# Patient Record
Sex: FEMALE | Race: WHITE | NOT HISPANIC OR LATINO
[De-identification: names, ages, dates, MRNs, and addresses within clinical notes are randomized per-mention and may not be internally consistent; named-entity substitution may affect disease eponyms.]

---

## 2019-04-14 ENCOUNTER — TRANSCRIPTION ENCOUNTER (OUTPATIENT)
Age: 34
End: 2019-04-14

## 2020-01-13 ENCOUNTER — TRANSCRIPTION ENCOUNTER (OUTPATIENT)
Age: 35
End: 2020-01-13

## 2020-10-20 ENCOUNTER — TRANSCRIPTION ENCOUNTER (OUTPATIENT)
Age: 35
End: 2020-10-20

## 2020-12-01 ENCOUNTER — TRANSCRIPTION ENCOUNTER (OUTPATIENT)
Age: 35
End: 2020-12-01

## 2021-06-06 ENCOUNTER — TRANSCRIPTION ENCOUNTER (OUTPATIENT)
Age: 36
End: 2021-06-06

## 2021-10-13 ENCOUNTER — NON-APPOINTMENT (OUTPATIENT)
Age: 36
End: 2021-10-13

## 2021-10-25 ENCOUNTER — TRANSCRIPTION ENCOUNTER (OUTPATIENT)
Age: 36
End: 2021-10-25

## 2021-11-16 DIAGNOSIS — M25.561 PAIN IN RIGHT KNEE: ICD-10-CM

## 2021-11-16 DIAGNOSIS — M25.562 PAIN IN RIGHT KNEE: ICD-10-CM

## 2021-11-16 PROBLEM — Z00.00 ENCOUNTER FOR PREVENTIVE HEALTH EXAMINATION: Status: ACTIVE | Noted: 2021-11-16

## 2021-11-19 ENCOUNTER — APPOINTMENT (OUTPATIENT)
Dept: ORTHOPEDIC SURGERY | Facility: CLINIC | Age: 36
End: 2021-11-19
Payer: COMMERCIAL

## 2021-11-19 ENCOUNTER — NON-APPOINTMENT (OUTPATIENT)
Age: 36
End: 2021-11-19

## 2021-11-19 VITALS — BODY MASS INDEX: 24.84 KG/M2 | WEIGHT: 135 LBS | HEIGHT: 62 IN

## 2021-11-19 DIAGNOSIS — Z87.891 PERSONAL HISTORY OF NICOTINE DEPENDENCE: ICD-10-CM

## 2021-11-19 DIAGNOSIS — Z78.9 OTHER SPECIFIED HEALTH STATUS: ICD-10-CM

## 2021-11-19 PROCEDURE — 73564 X-RAY EXAM KNEE 4 OR MORE: CPT | Mod: LT

## 2021-11-19 PROCEDURE — 20610 DRAIN/INJ JOINT/BURSA W/O US: CPT | Mod: RT

## 2021-11-19 PROCEDURE — 99204 OFFICE O/P NEW MOD 45 MIN: CPT | Mod: 25

## 2021-11-19 RX ORDER — MELOXICAM 15 MG/1
TABLET ORAL
Refills: 0 | Status: ACTIVE | COMMUNITY

## 2021-11-19 NOTE — ADDENDUM
[FreeTextEntry1] : This note was written by Federica Avelar on 11/19/2021 acting as scribe for Dr. Bartolo Harris M.D.\par \par I, Dr. Bartolo Harris, have read and attest that all the information, medical decision making and discharge instructions within are true and accurate.

## 2021-11-19 NOTE — HISTORY OF PRESENT ILLNESS
[de-identified] : CHAVEZ SMALLS  is a 36 year old female who presents for initial evaluation of bilateral knee pain for about 30 years with no injury. She said that her knee cap started dislocating at age 3. At 15 she had right knee surgery to keep it in place, at age 18 she had left knee surgery to keep it in place. At 19 she had a 2nd surgery on her left knee because the first one was not done correctly. She had post op therapy. In 2019 she had a right lateral release and in 2020 she had a left lateral release. 3 months ago she had Gel injections that did not help. Everything was done by Dr. Marcano except for the 1st surgery on the left knee. She continues to have pain over the patella. It is sharp on the left and dull when stiff.Clicking on the left with use of stairs and crunching on the right. She was given a brace which did not help. She can walk 1 mile with soreness and is unstable using stairs. Takes Mobic with help, but has to take it consistently.

## 2021-11-19 NOTE — DISCUSSION/SUMMARY
[de-identified] : Discussed at length the natural history of bilateral knee degenerative arthritis especially patellofemoral. We reviewed non-operative and operative treatment. Due to the pain, failure of prior nonoperative treatment including injections, NSAIDs, and physiotherapy, and associated disability I recommend bilateral total knee replacement even at her young age.The risks, benefits, convalescence and alternatives were reviewed. Numerous questions were asked and answered. Models were used as an educational tool.We did discuss implant choice and fixation, with shared decision making with the patient. Surgery will be scheduled at a convenient time most likely in the spring. Preop medical clearance.\par \par We did discuss their young age, level of post-op activity, mechanisms of failure and longevity of implants. In the interim she was treat with bilateral Cortisone injections as detailed above and will continue with Meloxicam. All explained to her  who was present.

## 2021-11-19 NOTE — PROCEDURE
[de-identified] : BILATERAL KNEE CORTISONE INJECTION\par Discussed at length with the patient the planned steroid and lidocaine injection. The risks, benefits, convalescence and alternatives were reviewed. The possible side effects discussed included but were not limited to: pain, swelling, heat and redness. These symptoms are generally mild but if they are extensive then contact the office. Giving pain relievers by mouth such as NSAID’s or Tylenol can generally treat the reactions to steroid and lidocaine. Rare cases of infection have been noted. Rash, hives and itching may occur post injection. If you have muscle pain or cramps, flushing and or swelling of the face, rapid heart beat, nausea, dizziness, fever, chills, headache, difficulty breathing, swelling in the arms or legs, or have a prickly feeling of your skin, contact a health care provider immediately.\par \par Following this discussion, the knee was prepped with betadine and under sterile conditions 5 cc of 2% lidocaine and 1 cc depo-medrol (40mg) were injected with a 21 gauge needle. The needle was introduced into the joint, aspiration was performed to ensure intra-articular placement and the medication was injected. Upon withdrawal of the needle the site was cleaned with alcohol and a bandaid applied. The patient tolerated the injection well and there were no adverse effects. Post injection instructions included no strenuous activity for 24 hours, cryotherapy and if there are any adverse effects to contact the office.

## 2021-11-19 NOTE — PHYSICAL EXAM
[de-identified] : General appearance: well nourished and hydrated, pleasant, alert and oriented x 3, cooperative.\par HEENT: Normocephalic, EOM intact, Nasal septum midline, Oral cavity clear, External auditory canal clear.\par Cardiovascular: no apparent abnormalities, no lower leg edema, no varicosities, pedal pulses are palpable.\par Lymphatics Lymph nodes: none palpated, Lymphedema: not present.\par Neurologic: sensation is normal, no muscle weakness in upper or lower extremities, patella tendon reflexes intact .\par Dermatologic no apparent skin lesions, moist, warm, no rash.\par Spine:cervical spine appears normal and moves freely, thoracic spine appears normal and moves freely, lumbosacral spine appears normal and moves freely.\par Gait: nonantalgic.\par \par Left knee\par Inspection: no effusion or erythema.\par Wounds: healed anterior medial incision, healed arthroscopic portals\par Alignment: normal.\par Palpation: no specific tenderness on palpation.\par ROM active (in degrees): 0-140 with crepitus and pain, apprehensive through exam \par Ligamentous laxity: all ligaments appear stable,, negative ant. drawer test, negative post. drawer test, stable to varus stress test, stable to valgus stress test. negative Lachman's test, negative pivot shift test\par Meniscal Test: negative McMurrays, negative Pennie.\par Patellofemoral Alignment Test: Q angle-, normal.\par Muscle Test: good quad strength.\par \par Right knee\par Inspection: no effusion or erythema.\par Wounds: healed midline incision, healed arthroscopic portals\par Alignment: normal.\par Palpation: medial and lateral joint line tenderness on palpation.\par ROM active (in degrees): 0-140 with crepitus and discomfort through the arc of motion, apprehensive\par Ligamentous laxity: all ligaments appear stable,, negative ant. drawer test, negative post. drawer test, stable to varus stress test, stable to valgus stress test. negative Lachman's test, negative pivot shift test\par Meniscal Test: negative McMurrays, negative Pennie.\par Patellofemoral Alignment Test: Q angle-, normal.\par Muscle Test: good quad strength.\par \par Left hip\par Inspection: No swelling or ecchymosis.\par Wounds: none.\par Palpation: non-tender.\par Stability: no instability.\par Strength: 5/5 all motor groups.\par ROM: no pain with FROM.\par Leg length: equal.\par \par Right hip\par Inspection: No swelling or ecchymosis.\par Wounds: none.\par Palpation: non-tender.\par Stability: no instability.\par Strength: 5/5 all motor groups.\par ROM: no pain with FROM.\par Leg length: equal.\par \par Left ankle\par Inspection: no erythema noted, no swelling noted.\par Palpation: no pain on palpation .\par ROM: FROM without crepitus.\par Muscle strength: 5/5.\par Stability: no instability noted.\par \par Right ankle\par Inspection: no erythema noted, no swelling noted.\par ROM: FROM without crepitus.\par Palpation: no pain on palpation .\par Muscle strength: 5/5.\par Stability: no instability noted.\par \par Left foot\par Inspection: color, texture and turgor are normal.\par ROM: full range of motion of all joints without pain or crepitus.\par Palpation: no tenderness.\par Stability: no instability noted.\par \par Right foot\par Inspection: color, texture and turgor are normal.\par ROM: full range of motion of all joints without pain or crepitus.\par Palpation: no tenderness.\par Stability: no instability noted.\par \par Left shoulder\par Inspection: no muscle asymmetry, no atrophy.\par Palpation: no tenderness noted, ACJ non-tender.\par ROM: full active ROM, full passive ROM.\par Strength testing): anterior deltoid, supraspinatus, infraspinatus, subscapularis all 5/5.\par Stability test: ant. apprehension negative, post. apprehension negative, relocation test negative.\par Impingement Test: negative NEER.\par \par Right shoulder\par Inspection: no muscle asymmetry, no atrophy.\par Palpation: no tenderness noted, ACJ non-tender.\par ROM: full active ROM, full passive ROM.\par Strength testing): anterior deltoid, supraspinatus, infraspinatus, subscapularis all 5/5.\par Stability test: ant. apprehension negative, post. apprehension negative, relocation test negative.\par Impingement Test: negative NEER.\par Surgical Wounds: none.\par \par Left elbow\par Inspection: negative swelling.\par Wounds: none.\par Palpation: non-tender.\par ROM: full ROM.\par Strength: 5/5 all groups.\par Stability: no instability.\par Mass: none.\par \par Right elbow\par Inspection: negative swelling.\par Wounds: none.\par Palpation: non-tender.\par ROM: full ROM.\par Strength: 5/5 all groups.\par Stability: no instability.\par Mass: none.\par \par Left wrist\par Inspection: negative swelling.\par Wound: none.\par Palpation (bone): no tenderness.\par ROM: full ROM.\par Strength: full , good.\par \par Right wrist\par Inspection: negative swelling.\par Wound: none.\par Palpation (bone): no tenderness.\par ROM: full ROM.\par Strength: full , good.\par \par Left hand\par Inspection: no skin changes, normal appearance.\par Wounds: none.\par Strength: full , able to make full fist.\par Sensation: light touch intact all fingers and thumb.\par Vascular: good capillary refill < 3 seconds, all fingers and thumb.\par Mass: none.\par \par Right hand\par Inspection: no skin changes, normal appearance.\par Wounds: none.\par Strength: full , able to make full fist.\par Sensation: light touch intact all fingers and thumb.\par Vascular: good capillary refill < 3 seconds, all fingers and thumb.\par Mass: none. [de-identified] : Right knee xrays, standing AP/Lateral and Merchant films, and 45 degree PA standing view, taken at the office today shows degenerative arthritis, decrease lateral joint space narrowing, marginal osteophytes, sclerosis, patellofemoral joint space narrowing, peripheral osteophytes, joint space irregularity,Kellgren and Steven grade 2-3 with significant patellofemoral arthritis\par \par Left knee xrays, standing AP/Lateral and Merchant films, and 45 degree PA standing view, taken at the office today shows diffuse tricompartmental degenerative arthritis, lateral joint space narrowing, marginal osteophytes, bone on bone, sclerosis, patellofemoral joint space narrowing, peripheral osteophytes, lateral tilt, Kellgren and Steven grade 2\par

## 2021-11-20 ENCOUNTER — TRANSCRIPTION ENCOUNTER (OUTPATIENT)
Age: 36
End: 2021-11-20

## 2021-12-02 RX ORDER — MELOXICAM 7.5 MG/1
7.5 TABLET ORAL TWICE DAILY
Qty: 60 | Refills: 2 | Status: ACTIVE | COMMUNITY
Start: 2021-12-02 | End: 1900-01-01

## 2022-01-11 ENCOUNTER — APPOINTMENT (OUTPATIENT)
Dept: AFTER HOURS CARE | Facility: EMERGENCY ROOM | Age: 37
End: 2022-01-11
Payer: COMMERCIAL

## 2022-01-11 DIAGNOSIS — U07.1 COVID-19: ICD-10-CM

## 2022-01-11 PROCEDURE — 99203 OFFICE O/P NEW LOW 30 MIN: CPT | Mod: 95

## 2022-01-11 RX ORDER — ALBUTEROL SULFATE 90 UG/1
108 (90 BASE) INHALANT RESPIRATORY (INHALATION)
Qty: 1 | Refills: 1 | Status: ACTIVE | COMMUNITY
Start: 2022-01-11 | End: 1900-01-01

## 2022-01-11 RX ORDER — BENZONATATE 100 MG/1
100 CAPSULE ORAL
Qty: 21 | Refills: 0 | Status: ACTIVE | COMMUNITY
Start: 2022-01-11 | End: 1900-01-01

## 2022-01-11 NOTE — PLAN
[FreeTextEntry1] : pulm referral\par rx albuterol\par rx tessalon\par anticipatory guidance and precautions\par pmd follow up

## 2022-01-11 NOTE — ASSESSMENT
[FreeTextEntry1] : ongoing covid sx. hard to tell if this is "long covid" or just longer intial infection.

## 2022-01-11 NOTE — HISTORY OF PRESENT ILLNESS
[FreeTextEntry8] : 36F hx knee OA now p/w 2wks persistent cough in the setting of covid infection 2wk ago even though her other sx are mostly resolved. still some intermittent fevers and fatigue but no longer any congestion. little relief with delsym. Vaccinated for COVID.

## 2022-02-01 ENCOUNTER — TRANSCRIPTION ENCOUNTER (OUTPATIENT)
Age: 37
End: 2022-02-01

## 2022-02-03 ENCOUNTER — RX RENEWAL (OUTPATIENT)
Age: 37
End: 2022-02-03

## 2022-02-03 RX ORDER — MELOXICAM 15 MG/1
15 TABLET ORAL DAILY
Qty: 30 | Refills: 1 | Status: ACTIVE | COMMUNITY
Start: 2021-12-06 | End: 1900-01-01

## 2022-02-18 ENCOUNTER — APPOINTMENT (OUTPATIENT)
Dept: ORTHOPEDIC SURGERY | Facility: CLINIC | Age: 37
End: 2022-02-18
Payer: COMMERCIAL

## 2022-02-18 VITALS — BODY MASS INDEX: 27.6 KG/M2 | WEIGHT: 150 LBS | HEIGHT: 62 IN

## 2022-02-18 PROCEDURE — 20610 DRAIN/INJ JOINT/BURSA W/O US: CPT | Mod: 59,LT

## 2022-02-18 PROCEDURE — 73564 X-RAY EXAM KNEE 4 OR MORE: CPT | Mod: LT

## 2022-02-18 PROCEDURE — 99213 OFFICE O/P EST LOW 20 MIN: CPT | Mod: 25

## 2022-02-18 NOTE — ADDENDUM
[FreeTextEntry1] : This note was written by Federica Avelar on 02/18/2022 acting as scribe for Dr. Bartolo Harris M.D.\par \par I, Dr. Bartolo Harris, have read and attest that all the information, medical decision making and discharge instructions within are true and accurate.

## 2022-02-18 NOTE — DISCUSSION/SUMMARY
[de-identified] : Discussed at length the natural history of bilateral knee degenerative arthritis and reviewed non-operative and operative treatment. Due to the pain, failure of prior nonoperative treatment including injections, NSAIDs, and physiotherapy, and associated disability I recommend bilateral total knee replacement.The risks, benefits, convalescence and alternatives were reviewed. Numerous questions were asked and answered. Models were used as an educational tool.We did discuss implant choice and fixation, with shared decision making with the patient. Surgery is scheduled on May 26th. Preop medical clearance.\par \par In the interim patient was treat with bilateral knee Cortisone injections. Patient can continue with home exercise, Mobic and Tylenol and activities as tolerated. All questions answered, understanding verbalized. Patient in agreement with plan of care. I will see her back in 6 weeks.

## 2022-02-18 NOTE — PROCEDURE
[de-identified] : BILATERAL KNEE CORTISONE INJECTION\par Discussed at length with the patient the planned steroid and lidocaine injection. The risks, benefits, convalescence and alternatives were reviewed. The possible side effects discussed included but were not limited to: pain, swelling, heat and redness. These symptoms are generally mild but if they are extensive then contact the office. Giving pain relievers by mouth such as NSAID’s or Tylenol can generally treat the reactions to steroid and lidocaine. Rare cases of infection have been noted. Rash, hives and itching may occur post injection. If you have muscle pain or cramps, flushing and or swelling of the face, rapid heart beat, nausea, dizziness, fever, chills, headache, difficulty breathing, swelling in the arms or legs, or have a prickly feeling of your skin, contact a health care provider immediately.\par \par Following this discussion, the knee was prepped with betadine and under sterile conditions 5 cc of 2% lidocaine and 1 cc depo-medrol (40mg) were injected with a 21 gauge needle. The needle was introduced into the joint, aspiration was performed to ensure intra-articular placement and the medication was injected. Upon withdrawal of the needle the site was cleaned with alcohol and a bandaid applied. The patient tolerated the injection well and there were no adverse effects. Post injection instructions included no strenuous activity for 24 hours, cryotherapy and if there are any adverse effects to contact the office.

## 2022-02-18 NOTE — HISTORY OF PRESENT ILLNESS
[de-identified] : CHAVEZ SMALLS is a 36 year old female who presents for follow up evaluation of bilateral knee arthritis. She is undergoing nonoperative treatment with not much improvement. She would like Cortisone today. Pt changed her Mobic 7.5 from once daily to twice daily. She had tried Tramadol and Tylenol with no help. She is not ready for a TKR and would like to continue nonoperatively.

## 2022-02-18 NOTE — PHYSICAL EXAM
[de-identified] : General appearance: well nourished and hydrated, pleasant, alert and oriented x 3, cooperative.\par HEENT: Normocephalic, EOM intact, Nasal septum midline, Oral cavity clear, External auditory canal clear.\par Cardiovascular: no apparent abnormalities, no lower leg edema, no varicosities, pedal pulses are palpable.\par Lymphatics Lymph nodes: none palpated, Lymphedema: not present.\par Neurologic: sensation is normal, no muscle weakness in upper or lower extremities, patella tendon reflexes intact .\par Dermatologic no apparent skin lesions, moist, warm, no rash.\par Spine:cervical spine appears normal and moves freely, thoracic spine appears normal and moves freely, lumbosacral spine appears normal and moves freely.\par Gait: nonantalgic.\par \par Left knee\par Inspection: no effusion or erythema.\par Wounds: healed anterior medial incision, healed arthroscopic portals\par Alignment: normal.\par Palpation: no specific tenderness on palpation.\par ROM active (in degrees): 0-140 with crepitus and pain, apprehensive through exam \par Ligamentous laxity: all ligaments appear stable,, negative ant. drawer test, negative post. drawer test, stable to varus stress test, stable to valgus stress test. negative Lachman's test, negative pivot shift test\par Meniscal Test: negative McMurrays, negative Pennie.\par Patellofemoral Alignment Test: Q angle-, normal.\par Muscle Test: good quad strength.\par \par Right knee\par Inspection: no effusion or erythema.\par Wounds: healed midline incision, healed arthroscopic portals\par Alignment: normal.\par Palpation: medial and lateral joint line tenderness on palpation.\par ROM active (in degrees): 0-140 with crepitus and discomfort through the arc of motion, apprehensive\par Ligamentous laxity: all ligaments appear stable,, negative ant. drawer test, negative post. drawer test, stable to varus stress test, stable to valgus stress test. negative Lachman's test, negative pivot shift test\par Meniscal Test: negative McMurrays, negative Pennie.\par Patellofemoral Alignment Test: Q angle-, normal.\par Muscle Test: good quad strength. [de-identified] : Right knee xrays, standing AP/Lateral and Merchant films, and 45 degree PA standing view, taken at the office today shows degenerative arthritis, decrease lateral joint space narrowing, marginal osteophytes, sclerosis, patellofemoral joint space narrowing, peripheral osteophytes, joint space irregularity,Kellgren and Steven grade 2-3 with significant patellofemoral arthritis\par \par Left knee xrays, standing AP/Lateral and Merchant films, and 45 degree PA standing view, taken at the office today shows diffuse tricompartmental degenerative arthritis, lateral joint space narrowing, marginal osteophytes, bone on bone, sclerosis, patellofemoral joint space narrowing, peripheral osteophytes, lateral tilt, Kellgren and Steven grade 2

## 2022-04-06 ENCOUNTER — APPOINTMENT (OUTPATIENT)
Dept: ORTHOPEDIC SURGERY | Facility: CLINIC | Age: 37
End: 2022-04-06
Payer: COMMERCIAL

## 2022-04-06 VITALS — HEIGHT: 60 IN | WEIGHT: 145 LBS | BODY MASS INDEX: 28.47 KG/M2

## 2022-04-06 PROCEDURE — 99214 OFFICE O/P EST MOD 30 MIN: CPT

## 2022-04-06 PROCEDURE — 73564 X-RAY EXAM KNEE 4 OR MORE: CPT | Mod: LT

## 2022-04-06 NOTE — ADDENDUM
[FreeTextEntry1] : This note was written by Federica Avelar on 04/06/2022 acting as scribe for Dr. Bartolo Harris M.D.\par \par I, Dr. Bartolo Harris, have read and attest that all the information, medical decision making and discharge instructions within are true and accurate.

## 2022-04-06 NOTE — DISCUSSION/SUMMARY
[de-identified] : Discussed at length the natural history of bilateral knee degenerative arthritis and reviewed non-operative and operative treatment. Due to the pain, failure of prior nonoperative treatment including injections, NSAIDs, and physiotherapy, and associated disability I recommend bilateral total knee replacement.The risks, benefits, convalescence and alternatives were reviewed. Numerous questions were asked and answered. Models were used as an educational tool.We did discuss implant choice and fixation, with shared decision making with the patient. Surgery is scheduled on May 26th. Preop medical and pulmonary clearance.

## 2022-04-06 NOTE — PHYSICAL EXAM
[de-identified] : General appearance: well nourished and hydrated, pleasant, alert and oriented x 3, cooperative.\par HEENT: Normocephalic, EOM intact, Nasal septum midline, Oral cavity clear, External auditory canal clear.\par Cardiovascular: no apparent abnormalities, no lower leg edema, no varicosities, pedal pulses are palpable.\par Lymphatics Lymph nodes: none palpated, Lymphedema: not present.\par Neurologic: sensation is normal, no muscle weakness in upper or lower extremities, patella tendon reflexes intact .\par Dermatologic no apparent skin lesions, moist, warm, no rash.\par Spine:cervical spine appears normal and moves freely, thoracic spine appears normal and moves freely, lumbosacral spine appears normal and moves freely.\par Gait: nonantalgic.\par \par Left knee\par Inspection: no effusion or erythema.\par Wounds: healed anterior medial incision, healed arthroscopic portals\par Alignment: normal.\par Palpation: no specific tenderness on palpation.\par ROM active (in degrees): 0-140 with crepitus and pain, apprehensive through exam \par Ligamentous laxity: all ligaments appear stable,, negative ant. drawer test, negative post. drawer test, stable to varus stress test, stable to valgus stress test. negative Lachman's test, negative pivot shift test\par Meniscal Test: negative McMurrays, negative Pennie.\par Patellofemoral Alignment Test: Q angle-, normal.\par Muscle Test: good quad strength.\par \par Right knee\par Inspection: no effusion or erythema.\par Wounds: healed midline incision, healed arthroscopic portals\par Alignment: normal.\par Palpation: medial and lateral joint line tenderness on palpation.\par ROM active (in degrees): 0-140 with crepitus and discomfort through the arc of motion, apprehensive\par Ligamentous laxity: all ligaments appear stable,, negative ant. drawer test, negative post. drawer test, stable to varus stress test, stable to valgus stress test. negative Lachman's test, negative pivot shift test\par Meniscal Test: negative McMurrays, negative Pennie.\par Patellofemoral Alignment Test: Q angle-, normal.\par Muscle Test: good quad strength.  [de-identified] : Right knee xrays, standing AP/Lateral and Merchant films, and 45 degree PA standing view, taken at the office today shows degenerative arthritis, decrease lateral joint space narrowing, marginal osteophytes, sclerosis, patellofemoral joint space narrowing, peripheral osteophytes, joint space irregularity,Kellgren and Steven grade 2-3 with significant patellofemoral arthritis\par \par Left knee xrays, standing AP/Lateral and Merchant films, and 45 degree PA standing view, taken at the office today shows diffuse tricompartmental degenerative arthritis, lateral joint space narrowing, marginal osteophytes, bone on bone, sclerosis, patellofemoral joint space narrowing, peripheral osteophytes, lateral tilt, Kellgren and Steven grade 2.

## 2022-04-06 NOTE — HISTORY OF PRESENT ILLNESS
[de-identified] : CHAVEZ SMALLS is a 36 year old female who presents for follow up evaluation of bilateral knee arthritis. She received Cortisone injection in Feb which helped. Takes Mobic daily. Pt is scheduled for bilateral TKR on May 26th. She is here for pre-surgical questions. Reports that she has asthma and has an emergency inhaler prn. States that she saw her pulmonologist 2 months ago when she had COVID and was given a rx for an inhaler but did not  due to the expansive co-pay but has not need it.

## 2022-05-18 ENCOUNTER — OUTPATIENT (OUTPATIENT)
Dept: OUTPATIENT SERVICES | Facility: HOSPITAL | Age: 37
LOS: 1 days | Discharge: HOME | End: 2022-05-18
Payer: COMMERCIAL

## 2022-05-18 VITALS
TEMPERATURE: 97 F | SYSTOLIC BLOOD PRESSURE: 126 MMHG | DIASTOLIC BLOOD PRESSURE: 80 MMHG | OXYGEN SATURATION: 100 % | HEIGHT: 60 IN | RESPIRATION RATE: 18 BRPM | HEART RATE: 90 BPM | WEIGHT: 148.59 LBS

## 2022-05-18 DIAGNOSIS — M17.0 BILATERAL PRIMARY OSTEOARTHRITIS OF KNEE: ICD-10-CM

## 2022-05-18 DIAGNOSIS — Z01.818 ENCOUNTER FOR OTHER PREPROCEDURAL EXAMINATION: ICD-10-CM

## 2022-05-18 DIAGNOSIS — Z98.890 OTHER SPECIFIED POSTPROCEDURAL STATES: Chronic | ICD-10-CM

## 2022-05-18 DIAGNOSIS — Z90.49 ACQUIRED ABSENCE OF OTHER SPECIFIED PARTS OF DIGESTIVE TRACT: Chronic | ICD-10-CM

## 2022-05-18 DIAGNOSIS — J45.909 UNSPECIFIED ASTHMA, UNCOMPLICATED: ICD-10-CM

## 2022-05-18 LAB
A1C WITH ESTIMATED AVERAGE GLUCOSE RESULT: 5.2 % — SIGNIFICANT CHANGE UP (ref 4–5.6)
ALBUMIN SERPL ELPH-MCNC: 3.7 G/DL — SIGNIFICANT CHANGE UP (ref 3.3–5)
ALP SERPL-CCNC: 77 U/L — SIGNIFICANT CHANGE UP (ref 40–120)
ALT FLD-CCNC: 22 U/L — SIGNIFICANT CHANGE UP (ref 12–78)
ANION GAP SERPL CALC-SCNC: 8 MMOL/L — SIGNIFICANT CHANGE UP (ref 5–17)
APPEARANCE UR: CLEAR — SIGNIFICANT CHANGE UP
APTT BLD: 44.5 SEC — HIGH (ref 27.5–35.5)
AST SERPL-CCNC: 16 U/L — SIGNIFICANT CHANGE UP (ref 15–37)
BASOPHILS # BLD AUTO: 0.02 K/UL — SIGNIFICANT CHANGE UP (ref 0–0.2)
BASOPHILS NFR BLD AUTO: 0.5 % — SIGNIFICANT CHANGE UP (ref 0–2)
BILIRUB SERPL-MCNC: 0.2 MG/DL — SIGNIFICANT CHANGE UP (ref 0.2–1.2)
BILIRUB UR-MCNC: NEGATIVE — SIGNIFICANT CHANGE UP
BLD GP AB SCN SERPL QL: SIGNIFICANT CHANGE UP
BUN SERPL-MCNC: 12 MG/DL — SIGNIFICANT CHANGE UP (ref 7–23)
CALCIUM SERPL-MCNC: 9.1 MG/DL — SIGNIFICANT CHANGE UP (ref 8.5–10.1)
CHLORIDE SERPL-SCNC: 105 MMOL/L — SIGNIFICANT CHANGE UP (ref 96–108)
CO2 SERPL-SCNC: 27 MMOL/L — SIGNIFICANT CHANGE UP (ref 22–31)
COLOR SPEC: YELLOW — SIGNIFICANT CHANGE UP
CREAT SERPL-MCNC: 0.65 MG/DL — SIGNIFICANT CHANGE UP (ref 0.5–1.3)
DIFF PNL FLD: NEGATIVE — SIGNIFICANT CHANGE UP
EGFR: 116 ML/MIN/1.73M2 — SIGNIFICANT CHANGE UP
EOSINOPHIL # BLD AUTO: 0.04 K/UL — SIGNIFICANT CHANGE UP (ref 0–0.5)
EOSINOPHIL NFR BLD AUTO: 1 % — SIGNIFICANT CHANGE UP (ref 0–6)
ESTIMATED AVERAGE GLUCOSE: 103 MG/DL — SIGNIFICANT CHANGE UP (ref 68–114)
GLUCOSE SERPL-MCNC: 98 MG/DL — SIGNIFICANT CHANGE UP (ref 70–99)
GLUCOSE UR QL: NEGATIVE MG/DL — SIGNIFICANT CHANGE UP
HCG UR QL: NEGATIVE — SIGNIFICANT CHANGE UP
HCT VFR BLD CALC: 40.9 % — SIGNIFICANT CHANGE UP (ref 34.5–45)
HGB BLD-MCNC: 13.9 G/DL — SIGNIFICANT CHANGE UP (ref 11.5–15.5)
IMM GRANULOCYTES NFR BLD AUTO: 0.3 % — SIGNIFICANT CHANGE UP (ref 0–1.5)
INR BLD: 0.97 RATIO — SIGNIFICANT CHANGE UP (ref 0.88–1.16)
KETONES UR-MCNC: NEGATIVE — SIGNIFICANT CHANGE UP
LEUKOCYTE ESTERASE UR-ACNC: NEGATIVE — SIGNIFICANT CHANGE UP
LYMPHOCYTES # BLD AUTO: 1.24 K/UL — SIGNIFICANT CHANGE UP (ref 1–3.3)
LYMPHOCYTES # BLD AUTO: 31.6 % — SIGNIFICANT CHANGE UP (ref 13–44)
MCHC RBC-ENTMCNC: 30.2 PG — SIGNIFICANT CHANGE UP (ref 27–34)
MCHC RBC-ENTMCNC: 34 G/DL — SIGNIFICANT CHANGE UP (ref 32–36)
MCV RBC AUTO: 88.9 FL — SIGNIFICANT CHANGE UP (ref 80–100)
MONOCYTES # BLD AUTO: 0.3 K/UL — SIGNIFICANT CHANGE UP (ref 0–0.9)
MONOCYTES NFR BLD AUTO: 7.6 % — SIGNIFICANT CHANGE UP (ref 2–14)
MRSA PCR RESULT.: SIGNIFICANT CHANGE UP
NEUTROPHILS # BLD AUTO: 2.32 K/UL — SIGNIFICANT CHANGE UP (ref 1.8–7.4)
NEUTROPHILS NFR BLD AUTO: 59 % — SIGNIFICANT CHANGE UP (ref 43–77)
NITRITE UR-MCNC: NEGATIVE — SIGNIFICANT CHANGE UP
NRBC # BLD: 0 /100 WBCS — SIGNIFICANT CHANGE UP (ref 0–0)
PH UR: 7 — SIGNIFICANT CHANGE UP (ref 5–8)
PLATELET # BLD AUTO: 218 K/UL — SIGNIFICANT CHANGE UP (ref 150–400)
POTASSIUM SERPL-MCNC: 4.4 MMOL/L — SIGNIFICANT CHANGE UP (ref 3.5–5.3)
POTASSIUM SERPL-SCNC: 4.4 MMOL/L — SIGNIFICANT CHANGE UP (ref 3.5–5.3)
PROT SERPL-MCNC: 6.9 GM/DL — SIGNIFICANT CHANGE UP (ref 6–8.3)
PROT UR-MCNC: NEGATIVE MG/DL — SIGNIFICANT CHANGE UP
PROTHROM AB SERPL-ACNC: 11.5 SEC — SIGNIFICANT CHANGE UP (ref 10.5–13.4)
RBC # BLD: 4.6 M/UL — SIGNIFICANT CHANGE UP (ref 3.8–5.2)
RBC # FLD: 13.3 % — SIGNIFICANT CHANGE UP (ref 10.3–14.5)
S AUREUS DNA NOSE QL NAA+PROBE: SIGNIFICANT CHANGE UP
SODIUM SERPL-SCNC: 140 MMOL/L — SIGNIFICANT CHANGE UP (ref 135–145)
SP GR SPEC: 1 — LOW (ref 1.01–1.02)
UROBILINOGEN FLD QL: NEGATIVE MG/DL — SIGNIFICANT CHANGE UP
VIT D25+D1,25 OH+D1,25 PNL SERPL-MCNC: 52.4 PG/ML — SIGNIFICANT CHANGE UP (ref 19.9–79.3)
WBC # BLD: 3.93 K/UL — SIGNIFICANT CHANGE UP (ref 3.8–10.5)
WBC # FLD AUTO: 3.93 K/UL — SIGNIFICANT CHANGE UP (ref 3.8–10.5)

## 2022-05-18 PROCEDURE — 93010 ELECTROCARDIOGRAM REPORT: CPT

## 2022-05-18 NOTE — H&P PST ADULT - NSICDXPASTSURGICALHX_GEN_ALL_CORE_FT
PAST SURGICAL HISTORY:  H/O knee surgery left x3    H/O right knee surgery x2    History of cholecystectomy     S/P foot surgery, left

## 2022-05-18 NOTE — OCCUPATIONAL THERAPY INITIAL EVALUATION ADULT - PERTINENT HX OF CURRENT PROBLEM, REHAB EVAL
Pt is a 35 y/o female slated for elective surgery for bilateral TKR wit MD Harris Pt's mobility is limited due to severe pain with decreased ROM and weakness 5/26/22, due to OA, chronic pain and DJD. Pt reported buckling in her knees, but denied any  falls in the past 3-6 months

## 2022-05-18 NOTE — OCCUPATIONAL THERAPY INITIAL EVALUATION ADULT - ANTICIPATED DISCHARGE DISPOSITION, OT EVAL
Recommend acute rehab postoperatively with to enable patient to safely perform ADL management and functional mobility.  Pt needs a 3-in-1 commode and  rolling walker

## 2022-05-18 NOTE — H&P PST ADULT - HISTORY OF PRESENT ILLNESS
36F pmh asthma (never intubated), anxiety c/o b/l knee pain 2/2 bilateral primary osteoarthritis of knee here for PST for scheduled Bilateral total knee replacement  This patient reports being infected with COVID 1/2022 symptoms described as fever, congestion, cough, difficulty breathing and chest pain she has fully recovered and currently denies any fever, cough, sob, flu like symptoms or travel outside of the [place] in the past 30 days

## 2022-05-18 NOTE — H&P PST ADULT - IS PATIENT PREGNANT?
no
I will SWITCH the dose or number of times a day I take the medications listed below when I get home from the hospital:  None

## 2022-05-18 NOTE — OCCUPATIONAL THERAPY INITIAL EVALUATION ADULT - LIVES WITH, PROFILE
her spouse and 3 children in a private house with house with 5 entry steps without any handrail. The side entrance has 4 steps with left ascending handrail. Once inside, pt has to negotiate a flight of stairs  with 15 steps  and right ascending handrail 2/3 od the way, to access the bedroom and bathroom. The home is equipped with 2 bathrooms.  Each bathroom has a tub/shower combination, fixed showerhead and standard toilet with adequate space to fit a commode over it. . her spouse and 3 children in a private house with house with 5 entry steps without any handrail. The side entrance has 4 steps with left ascending handrail. Once inside, pt has to negotiate a flight of stairs  with 15 steps  and right ascending handrail 2/3 of the way, to access the bedroom and bathroom. The home is equipped with 2 bathrooms. Each bathroom has a tub/shower combination, fixed showerhead and standard toilet with adequate space to fit a commode over it. .

## 2022-05-18 NOTE — PHYSICAL THERAPY INITIAL EVALUATION ADULT - ASR EQUIP NEEDS DISCH PT EVAL
Pt owns a straight cane and crutches and they are in good working condition./3:1 commode/rolling walker (5 inch wheels)

## 2022-05-18 NOTE — OCCUPATIONAL THERAPY INITIAL EVALUATION ADULT - GENERAL OBSERVATIONS, REHAB EVAL
Chart reviewed. Patient encountered seated in chair in rehab preop room in [facility]. Patient underwent occupational therapy pre-operative consultation to determine current functional ADL limitations in order to provide the right equipment for patient to perform functional ADL post operation.

## 2022-05-18 NOTE — PHYSICAL THERAPY INITIAL EVALUATION ADULT - PERTINENT HX OF CURRENT PROBLEM, REHAB EVAL
Neri knee OA c chronic pain and  limiting functional mobility. Pt is scheduled for neri knee elective total joint replacement on 5/26/22  by  Dr. Harris.

## 2022-05-18 NOTE — OCCUPATIONAL THERAPY INITIAL EVALUATION ADULT - ADDITIONAL COMMENTS
Presently , pt is functioning in her roles, self sufficient, driving & ambulating independently in the community without any assistive devices. Pt c/o 6/10 pain in her knees at rest and 10/10 at worst The pain is exacerbated, by walking, prolonged standing, negotiating steps and is relieved with Tylenol, Advil Tylenol  and Mobic PRN. Pt scores 90% of patient specific scale. Presently, pt is functioning in her roles, self sufficient, driving & ambulating independently in the community without any assistive devices. Pt c/o 6/10 pain in her knees at rest and 10/10 at worst The pain is exacerbated, by walking, prolonged standing, negotiating steps and is relieved with Tylenol, Advil Tylenol  and Mobic PRN. Pt scores 90% of patient specific scale.

## 2022-05-18 NOTE — H&P PST ADULT - ASSESSMENT
36F pmh asthma (never intubated), anxiety c/o b/l knee pain 2/2 bilateral primary osteoarthritis of knee here for PST for scheduled Bilateral total knee replacement  CAPRINI SCORE    AGE RELATED RISK FACTORS                                                       MOBILITY RELATED FACTORS  [ ] Age 41-60 years                                            (1 Point)                  [ ] Bed rest                                                        (1 Point)  [ ] Age: 61-74 years                                           (2 Points)                [ ] Plaster cast                                                   (2 Points)  [ ] Age= 75 years                                              (3 Points)                 [ ] Bed bound for more than 72 hours                   (2 Points)    DISEASE RELATED RISK FACTORS                                               GENDER SPECIFIC FACTORS  [ ] Edema in the lower extremities                       (1 Point)                  [ ] Pregnancy                                                     (1 Point)  [ ] Varicose veins                                               (1 Point)                  [ ] Post-partum < 6 weeks                                   (1 Point)             [x ] BMI > 25 Kg/m2                                            (1 Point)                  [ ] Hormonal therapy  or oral contraception            (1 Point)                 [ ] Sepsis (in the previous month)                        (1 Point)                  [ ] History of pregnancy complications  [ ] Pneumonia or serious lung disease                                               [ ] Unexplained or recurrent                       (1 Point)           (in the previous month)                               (1 Point)  [ ] Abnormal pulmonary function test                     (1 Point)                 SURGERY RELATED RISK FACTORS  [ ] Acute myocardial infarction                              (1 Point)                 [ ]  Section                                            (1 Point)  [ ] Congestive heart failure (in the previous month)  (1 Point)                 [ ] Minor surgery                                                 (1 Point)   [ ] Inflammatory bowel disease                             (1 Point)                 [ ] Arthroscopic surgery                                        (2 Points)  [ ] Central venous access                                    (2 Points)                [ ] General surgery lasting more than 45 minutes   (2 Points)       [ ] Stroke (in the previous month)                          (5 Points)               [ x] Elective arthroplasty                                        (5 Points)                                                                                                                                               HEMATOLOGY RELATED FACTORS                                                 TRAUMA RELATED RISK FACTORS  [ ] Prior episodes of VTE                                     (3 Points)                 [ ] Fracture of the hip, pelvis, or leg                       (5 Points)  [ ] Positive family history for VTE                         (3 Points)                 [ ] Acute spinal cord injury (in the previous month)  (5 Points)  [ ] Prothrombin 39037 A                                      (3 Points)                 [ ] Paralysis  (less than 1 month)                          (5 Points)  [ ] Factor V Leiden                                             (3 Points)                 [ ] Multiple Trauma within 1 month                         (5 Points)  [ ] Lupus anticoagulants                                     (3 Points)                                                           [ ] Anticardiolipin antibodies                                (3 Points)                                                       [ ] High homocysteine in the blood                      (3 Points)                                             [ ] Other congenital or acquired thrombophilia       (3 Points)                                                [ ] Heparin induced thrombocytopenia                  (3 Points)                                          Total Score [    6      ]

## 2022-05-18 NOTE — H&P PST ADULT - PROBLEM SELECTOR PLAN 1
Bilateral total knee reoplacement  labs - cbc,pt/ptt,bmp,t&s,nose cx,ekg  M/C required  pulmonary clearance  preop 3 day hibiclens instruction reviewed and given .instructed on if  nose cx positive use mupuricin 5 days and checklist given  take routine meds DOS with sips of water. avoid NSAID and OTC supplements. verbalized understanding  information on proper nutrition , increase protein and better food choices provided in packet   ensure clear given  Anesthesiologist to review PST labs, EKG, required clearances and optimization for surgery.

## 2022-05-18 NOTE — PHYSICAL THERAPY INITIAL EVALUATION ADULT - ADDITIONAL COMMENTS
There are 4 steps, c L rail up, at the side entry of the house and 15 steps, with R rail up for the first 2/3 steps and c L rail up for the last 1/3 steps, to negotiate at home. Pt has a tub/shower combo c fixed (1st floor bath)/retractable (2nd floor bath) shower head, grab bar, and regular toilet seat  in BR. 3-1 commode will fit in BR. Average pain level at rest is 6/10. Pain increases to 10/10 c activities.  Pt is R handed and drives. There are 4 steps, c L rail up, at the side entry of the house and 15 steps, with R rail up for the first 2/3 steps and c L rail up for the last 1/3 steps, to negotiate at home. Pt has a tub/shower combo c fixed (1st floor bath)/retractable (2nd floor bath) shower head, grab bar, and regular toilet seat  in BR. 3-1 commode will fit in BR. Average pain level at rest is 6/10. Pain increases to 10/10 c activities. Pt takes Mobic, Tramadol, Advil, Aleve, Tylenol PRN for pain management. Pt has no experience of adverse effects with pain medication. Pt is not on out-pt physical therapy at present. There is no h/o fall or knee buckling recently. Pt wears glasses for reading and no need for hearing aid. Pt is R handed and drives.

## 2022-05-18 NOTE — OCCUPATIONAL THERAPY INITIAL EVALUATION ADULT - RANGE OF MOTION EXAMINATION, LOWER EXTREMITY
ROM is limited in both knees due to pain/bilateral LE Active ROM was WFL  (within functional limits)/bilateral LE Passive ROM was WFL  (within functional limits)

## 2022-05-19 LAB
CULTURE RESULTS: NO GROWTH — SIGNIFICANT CHANGE UP
SPECIMEN SOURCE: SIGNIFICANT CHANGE UP

## 2022-05-24 LAB — SARS-COV-2 N GENE NPH QL NAA+PROBE: NOT DETECTED

## 2022-05-25 ENCOUNTER — FORM ENCOUNTER (OUTPATIENT)
Age: 37
End: 2022-05-25

## 2022-05-26 ENCOUNTER — APPOINTMENT (OUTPATIENT)
Dept: ORTHOPEDIC SURGERY | Facility: HOSPITAL | Age: 37
End: 2022-05-26

## 2022-05-26 RX ORDER — ALBUTEROL 90 UG/1
2 AEROSOL, METERED ORAL
Qty: 0 | Refills: 0 | DISCHARGE

## 2022-06-01 ENCOUNTER — OUTPATIENT (OUTPATIENT)
Dept: OUTPATIENT SERVICES | Facility: HOSPITAL | Age: 37
LOS: 1 days | Discharge: HOME | End: 2022-06-01

## 2022-06-01 DIAGNOSIS — U07.1 COVID-19: ICD-10-CM

## 2022-06-01 DIAGNOSIS — Z98.890 OTHER SPECIFIED POSTPROCEDURAL STATES: Chronic | ICD-10-CM

## 2022-06-01 DIAGNOSIS — Z90.49 ACQUIRED ABSENCE OF OTHER SPECIFIED PARTS OF DIGESTIVE TRACT: Chronic | ICD-10-CM

## 2022-06-01 LAB
FLUAV AG NPH QL: SIGNIFICANT CHANGE UP
FLUBV AG NPH QL: SIGNIFICANT CHANGE UP
SARS-COV-2 RNA SPEC QL NAA+PROBE: SIGNIFICANT CHANGE UP

## 2022-06-01 RX ORDER — ENOXAPARIN SODIUM 100 MG/ML
40 INJECTION SUBCUTANEOUS EVERY 24 HOURS
Refills: 0 | Status: DISCONTINUED | OUTPATIENT
Start: 2022-06-03 | End: 2022-06-09

## 2022-06-01 RX ORDER — ACETAMINOPHEN 500 MG
975 TABLET ORAL EVERY 8 HOURS
Refills: 0 | Status: DISCONTINUED | OUTPATIENT
Start: 2022-06-02 | End: 2022-06-09

## 2022-06-01 RX ORDER — POLYETHYLENE GLYCOL 3350 17 G/17G
17 POWDER, FOR SOLUTION ORAL AT BEDTIME
Refills: 0 | Status: DISCONTINUED | OUTPATIENT
Start: 2022-06-02 | End: 2022-06-09

## 2022-06-01 RX ORDER — HYDROMORPHONE HYDROCHLORIDE 2 MG/ML
0.5 INJECTION INTRAMUSCULAR; INTRAVENOUS; SUBCUTANEOUS ONCE
Refills: 0 | Status: COMPLETED | OUTPATIENT
Start: 2022-06-02 | End: 2022-06-09

## 2022-06-01 RX ORDER — SENNA PLUS 8.6 MG/1
2 TABLET ORAL AT BEDTIME
Refills: 0 | Status: DISCONTINUED | OUTPATIENT
Start: 2022-06-02 | End: 2022-06-09

## 2022-06-01 RX ORDER — BUDESONIDE AND FORMOTEROL FUMARATE DIHYDRATE 160; 4.5 UG/1; UG/1
2 AEROSOL RESPIRATORY (INHALATION)
Refills: 0 | Status: DISCONTINUED | OUTPATIENT
Start: 2022-06-02 | End: 2022-06-09

## 2022-06-01 RX ORDER — PANTOPRAZOLE SODIUM 20 MG/1
40 TABLET, DELAYED RELEASE ORAL
Refills: 0 | Status: DISCONTINUED | OUTPATIENT
Start: 2022-06-02 | End: 2022-06-09

## 2022-06-01 RX ORDER — TRAMADOL HYDROCHLORIDE 50 MG/1
50 TABLET ORAL EVERY 6 HOURS
Refills: 0 | Status: DISCONTINUED | OUTPATIENT
Start: 2022-06-02 | End: 2022-06-08

## 2022-06-01 RX ORDER — SODIUM CHLORIDE 9 MG/ML
1000 INJECTION, SOLUTION INTRAVENOUS
Refills: 0 | Status: DISCONTINUED | OUTPATIENT
Start: 2022-06-02 | End: 2022-06-08

## 2022-06-01 RX ORDER — OXYCODONE HYDROCHLORIDE 5 MG/1
5 TABLET ORAL
Refills: 0 | Status: DISCONTINUED | OUTPATIENT
Start: 2022-06-02 | End: 2022-06-07

## 2022-06-01 RX ORDER — FOLIC ACID 0.8 MG
1 TABLET ORAL DAILY
Refills: 0 | Status: DISCONTINUED | OUTPATIENT
Start: 2022-06-02 | End: 2022-06-09

## 2022-06-01 RX ORDER — ONDANSETRON 8 MG/1
4 TABLET, FILM COATED ORAL EVERY 6 HOURS
Refills: 0 | Status: DISCONTINUED | OUTPATIENT
Start: 2022-06-02 | End: 2022-06-09

## 2022-06-01 RX ORDER — CELECOXIB 200 MG/1
200 CAPSULE ORAL EVERY 12 HOURS
Refills: 0 | Status: DISCONTINUED | OUTPATIENT
Start: 2022-06-03 | End: 2022-06-09

## 2022-06-01 RX ORDER — OXYCODONE HYDROCHLORIDE 5 MG/1
10 TABLET ORAL
Refills: 0 | Status: DISCONTINUED | OUTPATIENT
Start: 2022-06-02 | End: 2022-06-09

## 2022-06-01 RX ORDER — MAGNESIUM HYDROXIDE 400 MG/1
30 TABLET, CHEWABLE ORAL DAILY
Refills: 0 | Status: DISCONTINUED | OUTPATIENT
Start: 2022-06-02 | End: 2022-06-09

## 2022-06-02 ENCOUNTER — INPATIENT (INPATIENT)
Facility: HOSPITAL | Age: 37
LOS: 6 days | Discharge: NURSING, REHAB OR LONG-TERM CARE | End: 2022-06-09
Attending: ORTHOPAEDIC SURGERY | Admitting: ORTHOPAEDIC SURGERY
Payer: COMMERCIAL

## 2022-06-02 ENCOUNTER — APPOINTMENT (OUTPATIENT)
Dept: ORTHOPEDIC SURGERY | Facility: HOSPITAL | Age: 37
End: 2022-06-02

## 2022-06-02 ENCOUNTER — TRANSCRIPTION ENCOUNTER (OUTPATIENT)
Age: 37
End: 2022-06-02

## 2022-06-02 VITALS
DIASTOLIC BLOOD PRESSURE: 80 MMHG | OXYGEN SATURATION: 99 % | HEIGHT: 65 IN | WEIGHT: 145.06 LBS | HEART RATE: 85 BPM | SYSTOLIC BLOOD PRESSURE: 127 MMHG | TEMPERATURE: 98 F | RESPIRATION RATE: 14 BRPM

## 2022-06-02 DIAGNOSIS — Z90.49 ACQUIRED ABSENCE OF OTHER SPECIFIED PARTS OF DIGESTIVE TRACT: Chronic | ICD-10-CM

## 2022-06-02 DIAGNOSIS — Z98.890 OTHER SPECIFIED POSTPROCEDURAL STATES: Chronic | ICD-10-CM

## 2022-06-02 PROBLEM — F41.9 ANXIETY DISORDER, UNSPECIFIED: Chronic | Status: ACTIVE | Noted: 2022-05-18

## 2022-06-02 PROBLEM — J45.909 UNSPECIFIED ASTHMA, UNCOMPLICATED: Chronic | Status: ACTIVE | Noted: 2022-05-18

## 2022-06-02 PROBLEM — Z86.69 PERSONAL HISTORY OF OTHER DISEASES OF THE NERVOUS SYSTEM AND SENSE ORGANS: Chronic | Status: ACTIVE | Noted: 2022-05-18

## 2022-06-02 PROBLEM — Z87.39 PERSONAL HISTORY OF OTHER DISEASES OF THE MUSCULOSKELETAL SYSTEM AND CONNECTIVE TISSUE: Chronic | Status: ACTIVE | Noted: 2022-05-18

## 2022-06-02 LAB
ANION GAP SERPL CALC-SCNC: 11 MMOL/L — SIGNIFICANT CHANGE UP (ref 5–17)
APTT BLD: 36.3 SEC — HIGH (ref 27.5–35.5)
APTT BLD: 45.4 SEC — HIGH (ref 27.5–35.5)
BUN SERPL-MCNC: 15 MG/DL — SIGNIFICANT CHANGE UP (ref 7–23)
CALCIUM SERPL-MCNC: 8.3 MG/DL — LOW (ref 8.5–10.1)
CHLORIDE SERPL-SCNC: 104 MMOL/L — SIGNIFICANT CHANGE UP (ref 96–108)
CO2 SERPL-SCNC: 23 MMOL/L — SIGNIFICANT CHANGE UP (ref 22–31)
CREAT SERPL-MCNC: 0.82 MG/DL — SIGNIFICANT CHANGE UP (ref 0.5–1.3)
EGFR: 94 ML/MIN/1.73M2 — SIGNIFICANT CHANGE UP
GLUCOSE SERPL-MCNC: 166 MG/DL — HIGH (ref 70–99)
HCG UR QL: NEGATIVE — SIGNIFICANT CHANGE UP
HCT VFR BLD CALC: 34.7 % — SIGNIFICANT CHANGE UP (ref 34.5–45)
HCT VFR BLD CALC: 40.1 % — SIGNIFICANT CHANGE UP (ref 34.5–45)
HCT VFR BLD CALC: 41 % — SIGNIFICANT CHANGE UP (ref 34.5–45)
HGB BLD-MCNC: 12 G/DL — SIGNIFICANT CHANGE UP (ref 11.5–15.5)
HGB BLD-MCNC: 13.7 G/DL — SIGNIFICANT CHANGE UP (ref 11.5–15.5)
HGB BLD-MCNC: 13.8 G/DL — SIGNIFICANT CHANGE UP (ref 11.5–15.5)
INR BLD: 1.03 RATIO — SIGNIFICANT CHANGE UP (ref 0.88–1.16)
INR BLD: 1.09 RATIO — SIGNIFICANT CHANGE UP (ref 0.88–1.16)
MCHC RBC-ENTMCNC: 30.2 PG — SIGNIFICANT CHANGE UP (ref 27–34)
MCHC RBC-ENTMCNC: 30.2 PG — SIGNIFICANT CHANGE UP (ref 27–34)
MCHC RBC-ENTMCNC: 30.3 PG — SIGNIFICANT CHANGE UP (ref 27–34)
MCHC RBC-ENTMCNC: 33.7 G/DL — SIGNIFICANT CHANGE UP (ref 32–36)
MCHC RBC-ENTMCNC: 34.2 G/DL — SIGNIFICANT CHANGE UP (ref 32–36)
MCHC RBC-ENTMCNC: 34.6 G/DL — SIGNIFICANT CHANGE UP (ref 32–36)
MCV RBC AUTO: 87.6 FL — SIGNIFICANT CHANGE UP (ref 80–100)
MCV RBC AUTO: 88.3 FL — SIGNIFICANT CHANGE UP (ref 80–100)
MCV RBC AUTO: 89.7 FL — SIGNIFICANT CHANGE UP (ref 80–100)
NRBC # BLD: 0 /100 WBCS — SIGNIFICANT CHANGE UP (ref 0–0)
PLATELET # BLD AUTO: 192 K/UL — SIGNIFICANT CHANGE UP (ref 150–400)
PLATELET # BLD AUTO: 202 K/UL — SIGNIFICANT CHANGE UP (ref 150–400)
PLATELET # BLD AUTO: 212 K/UL — SIGNIFICANT CHANGE UP (ref 150–400)
POTASSIUM SERPL-MCNC: 3.9 MMOL/L — SIGNIFICANT CHANGE UP (ref 3.5–5.3)
POTASSIUM SERPL-SCNC: 3.9 MMOL/L — SIGNIFICANT CHANGE UP (ref 3.5–5.3)
PROTHROM AB SERPL-ACNC: 12.3 SEC — SIGNIFICANT CHANGE UP (ref 10.5–13.4)
PROTHROM AB SERPL-ACNC: 13 SEC — SIGNIFICANT CHANGE UP (ref 10.5–13.4)
RBC # BLD: 3.96 M/UL — SIGNIFICANT CHANGE UP (ref 3.8–5.2)
RBC # BLD: 4.54 M/UL — SIGNIFICANT CHANGE UP (ref 3.8–5.2)
RBC # BLD: 4.57 M/UL — SIGNIFICANT CHANGE UP (ref 3.8–5.2)
RBC # FLD: 13.1 % — SIGNIFICANT CHANGE UP (ref 10.3–14.5)
RBC # FLD: 13.2 % — SIGNIFICANT CHANGE UP (ref 10.3–14.5)
RBC # FLD: 13.2 % — SIGNIFICANT CHANGE UP (ref 10.3–14.5)
SODIUM SERPL-SCNC: 138 MMOL/L — SIGNIFICANT CHANGE UP (ref 135–145)
WBC # BLD: 11.79 K/UL — HIGH (ref 3.8–10.5)
WBC # BLD: 14.19 K/UL — HIGH (ref 3.8–10.5)
WBC # BLD: 5.53 K/UL — SIGNIFICANT CHANGE UP (ref 3.8–10.5)
WBC # FLD AUTO: 11.79 K/UL — HIGH (ref 3.8–10.5)
WBC # FLD AUTO: 14.19 K/UL — HIGH (ref 3.8–10.5)
WBC # FLD AUTO: 5.53 K/UL — SIGNIFICANT CHANGE UP (ref 3.8–10.5)

## 2022-06-02 PROCEDURE — 27447 TOTAL KNEE ARTHROPLASTY: CPT | Mod: 50

## 2022-06-02 DEVICE — ZIMMER/NEXGEN SMOOTH PIN 3.2X75MM: Type: IMPLANTABLE DEVICE | Site: BILATERAL, | Status: FUNCTIONAL

## 2022-06-02 DEVICE — FEM PERSONA CMT  CCR STD NRW SZ 5 R: Type: IMPLANTABLE DEVICE | Site: BILATERAL, | Status: FUNCTIONAL

## 2022-06-02 DEVICE — ZIMMER FEMALE HEX SCREW MAGNETIC 2.5MM X 25MM: Type: IMPLANTABLE DEVICE | Site: BILATERAL, | Status: FUNCTIONAL

## 2022-06-02 DEVICE — ZIMMER/NEXGEN HEX HEAD SCREW 3.5MM: Type: IMPLANTABLE DEVICE | Site: BILATERAL, | Status: FUNCTIONAL

## 2022-06-02 DEVICE — CEMENT PALACOS R: Type: IMPLANTABLE DEVICE | Site: BILATERAL, | Status: FUNCTIONAL

## 2022-06-02 DEVICE — STEM PSN TIB CMT SZ CL 5 DEG: Type: IMPLANTABLE DEVICE | Site: BILATERAL, | Status: FUNCTIONAL

## 2022-06-02 DEVICE — SURF ART PERSONA LT 6-5 CD 12MM: Type: IMPLANTABLE DEVICE | Site: BILATERAL, | Status: FUNCTIONAL

## 2022-06-02 DEVICE — FEM PERSONA PS CMT CCR NRW SZ 5 L: Type: IMPLANTABLE DEVICE | Site: BILATERAL, | Status: FUNCTIONAL

## 2022-06-02 DEVICE — STEM TIBIA PERSONA SZ  5/CR: Type: IMPLANTABLE DEVICE | Site: BILATERAL, | Status: FUNCTIONAL

## 2022-06-02 DEVICE — IMPLANTABLE DEVICE: Type: IMPLANTABLE DEVICE | Site: BILATERAL, | Status: FUNCTIONAL

## 2022-06-02 RX ORDER — CELECOXIB 200 MG/1
200 CAPSULE ORAL ONCE
Refills: 0 | Status: COMPLETED | OUTPATIENT
Start: 2022-06-02 | End: 2022-06-02

## 2022-06-02 RX ORDER — ACETAMINOPHEN 500 MG
1000 TABLET ORAL ONCE
Refills: 0 | Status: COMPLETED | OUTPATIENT
Start: 2022-06-02 | End: 2022-06-03

## 2022-06-02 RX ORDER — INFLUENZA VIRUS VACCINE 15; 15; 15; 15 UG/.5ML; UG/.5ML; UG/.5ML; UG/.5ML
0.5 SUSPENSION INTRAMUSCULAR ONCE
Refills: 0 | Status: DISCONTINUED | OUTPATIENT
Start: 2022-06-02 | End: 2022-06-09

## 2022-06-02 RX ORDER — APREPITANT 80 MG/1
40 CAPSULE ORAL DAILY
Refills: 0 | Status: DISCONTINUED | OUTPATIENT
Start: 2022-06-02 | End: 2022-06-02

## 2022-06-02 RX ORDER — SODIUM CHLORIDE 9 MG/ML
1000 INJECTION, SOLUTION INTRAVENOUS
Refills: 0 | Status: DISCONTINUED | OUTPATIENT
Start: 2022-06-02 | End: 2022-06-02

## 2022-06-02 RX ORDER — ACETAMINOPHEN 500 MG
650 TABLET ORAL ONCE
Refills: 0 | Status: COMPLETED | OUTPATIENT
Start: 2022-06-02 | End: 2022-06-02

## 2022-06-02 RX ORDER — ACETAMINOPHEN 500 MG
1000 TABLET ORAL ONCE
Refills: 0 | Status: COMPLETED | OUTPATIENT
Start: 2022-06-02 | End: 2022-06-02

## 2022-06-02 RX ORDER — HYDROMORPHONE HYDROCHLORIDE 2 MG/ML
0.5 INJECTION INTRAMUSCULAR; INTRAVENOUS; SUBCUTANEOUS
Refills: 0 | Status: DISCONTINUED | OUTPATIENT
Start: 2022-06-02 | End: 2022-06-02

## 2022-06-02 RX ORDER — CEFAZOLIN SODIUM 1 G
2000 VIAL (EA) INJECTION EVERY 8 HOURS
Refills: 0 | Status: COMPLETED | OUTPATIENT
Start: 2022-06-02 | End: 2022-06-03

## 2022-06-02 RX ORDER — ALBUTEROL 90 UG/1
2 AEROSOL, METERED ORAL EVERY 6 HOURS
Refills: 0 | Status: DISCONTINUED | OUTPATIENT
Start: 2022-06-02 | End: 2022-06-09

## 2022-06-02 RX ORDER — ONDANSETRON 8 MG/1
4 TABLET, FILM COATED ORAL ONCE
Refills: 0 | Status: DISCONTINUED | OUTPATIENT
Start: 2022-06-02 | End: 2022-06-02

## 2022-06-02 RX ORDER — ENOXAPARIN SODIUM 100 MG/ML
40 INJECTION SUBCUTANEOUS
Qty: 0 | Refills: 0 | DISCHARGE
Start: 2022-06-02 | End: 2022-06-16

## 2022-06-02 RX ORDER — HYDROMORPHONE HYDROCHLORIDE 2 MG/ML
1 INJECTION INTRAMUSCULAR; INTRAVENOUS; SUBCUTANEOUS
Refills: 0 | Status: DISCONTINUED | OUTPATIENT
Start: 2022-06-02 | End: 2022-06-02

## 2022-06-02 RX ADMIN — SODIUM CHLORIDE 100 MILLILITER(S): 9 INJECTION, SOLUTION INTRAVENOUS at 16:52

## 2022-06-02 RX ADMIN — HYDROMORPHONE HYDROCHLORIDE 1 MILLIGRAM(S): 2 INJECTION INTRAMUSCULAR; INTRAVENOUS; SUBCUTANEOUS at 16:52

## 2022-06-02 RX ADMIN — Medication 650 MILLIGRAM(S): at 12:47

## 2022-06-02 RX ADMIN — SODIUM CHLORIDE 75 MILLILITER(S): 9 INJECTION, SOLUTION INTRAVENOUS at 20:10

## 2022-06-02 RX ADMIN — OXYCODONE HYDROCHLORIDE 10 MILLIGRAM(S): 5 TABLET ORAL at 23:32

## 2022-06-02 RX ADMIN — SODIUM CHLORIDE 100 MILLILITER(S): 9 INJECTION, SOLUTION INTRAVENOUS at 17:05

## 2022-06-02 RX ADMIN — Medication 1000 MILLIGRAM(S): at 19:02

## 2022-06-02 RX ADMIN — ONDANSETRON 4 MILLIGRAM(S): 8 TABLET, FILM COATED ORAL at 19:29

## 2022-06-02 RX ADMIN — Medication 400 MILLIGRAM(S): at 18:32

## 2022-06-02 RX ADMIN — HYDROMORPHONE HYDROCHLORIDE 1 MILLIGRAM(S): 2 INJECTION INTRAMUSCULAR; INTRAVENOUS; SUBCUTANEOUS at 17:20

## 2022-06-02 RX ADMIN — OXYCODONE HYDROCHLORIDE 5 MILLIGRAM(S): 5 TABLET ORAL at 21:10

## 2022-06-02 RX ADMIN — POLYETHYLENE GLYCOL 3350 17 GRAM(S): 17 POWDER, FOR SOLUTION ORAL at 21:28

## 2022-06-02 RX ADMIN — OXYCODONE HYDROCHLORIDE 5 MILLIGRAM(S): 5 TABLET ORAL at 20:11

## 2022-06-02 RX ADMIN — SENNA PLUS 2 TABLET(S): 8.6 TABLET ORAL at 21:28

## 2022-06-02 RX ADMIN — APREPITANT 40 MILLIGRAM(S): 80 CAPSULE ORAL at 12:57

## 2022-06-02 RX ADMIN — CELECOXIB 200 MILLIGRAM(S): 200 CAPSULE ORAL at 12:47

## 2022-06-02 RX ADMIN — Medication 100 MILLIGRAM(S): at 21:30

## 2022-06-02 NOTE — DISCHARGE NOTE PROVIDER - NSDCCPCAREPLAN_GEN_ALL_CORE_FT
PRINCIPAL DISCHARGE DIAGNOSIS  Diagnosis: Osteoarthritis of knees, bilateral  Assessment and Plan of Treatment:

## 2022-06-02 NOTE — PHYSICAL THERAPY INITIAL EVALUATION ADULT - ADDITIONAL COMMENTS
as per patient: There are 5 steps, c L rail up, at the side entry of the house and 15 steps, with R rail up for the first 2/3 steps and c L rail up for the last 1/3 steps, to negotiate at home. Pt has a tub/shower combo c fixed (1st floor bath)/retractable (2nd floor bath) shower head, grab bar, and regular toilet seat  in BR. 3-1 commode will fit in BR. Average pain level at rest is 6/10. Pain increases to 10/10 c activities. Pt wears glasses for reading and no need for hearing aid. Pt is R handed and drives.

## 2022-06-02 NOTE — PHYSICAL THERAPY INITIAL EVALUATION ADULT - GENERAL OBSERVATIONS, REHAB EVAL
Pt encountered on supine, B knee dressing, IV removed before ambulation. + HemoVacs on B knees, feeling nausated.

## 2022-06-02 NOTE — PHYSICAL THERAPY INITIAL EVALUATION ADULT - REHAB POTENTIAL, PT EVAL
Type of procedure: PVI  Licensed independent practitioner: Negrito Llamas MD  Assistant: None  Description of procedure: After informed consent was obtained, the patient was brought to the Electrophysiology laboratory in the fasting state, and was prepped and draped in the usual sterile fashion. The patient was electively intubated by members of the anesthesia department, who provided sedation throughout the case. In addition an esophageal temperature probe was placed in the esophagus to allow dynamic temperate monitoring during ablation. The patient was under uninterrupted apixaban therapy.  Sheaths were placed as described in the procedure report, and catheters were advanced into the heart under fluoroscopic guidance without complications. Baseline intra-cardiac echocardiography (ICE) demonstrated the following: The LV size and functions were normal. There was no pericardial effusion at baseline  IV Heparin bolus was given followed by continuous drip to maintain the -400s throughout the case.  The left atrium was entered under fluoroscopic and ICE guidance by a single transseptal approach using a medium curve Encinal sheath. There were no complications.  A PentaRay Sascha F curve catheter and a 3.5 mm irrigated-tip [employer] ThermoCool D/F-curve ablation catheter were used for mapping and ablation. A 3D anatomy of the LA was performed using Carto 3 V7. There were no areas of low voltage in the LA.  We first isolated the left pulmonary veins in an antral approach with demonstration of entrance block. No grey lesions were required for isolation of the left PVs. We then similarly isolated the right pulmonary veins in an antral approach. Bilateral PVs showed evidence of entrance and exit block after pacing maneuvers. With pacing inside the PV, there was local PV capture without capture of the LA in the LSPV, LIPV and RSPV. Esophageal temperatures norman from a baseline of 35.8 degrees Celsius to a peak of 36.8 degrees Celsius.  The pulmonary veins remained isolated for >30 minutes, without evidence of acute reconnection. Burst pacing was performed from the proximal CS bipole down to 200ms without induction of any atrial arrhythmias.  Next a CTI ablation was performed with evidence of bidirectional block.  As such, heparin was stopped and the sheaths were pulled back to the right atrium. Repeat ICE imaging revealed no pericardial effusion.  All catheters were removed from the body and sheaths were left in place for removal once ACT declines to below 200 seconds.   A total of 70mg of protamine was given to reverse the Heparin effect.    The patient tolerated the procedure well and was successfully extubated and transferred to the PACU for further monitoring. There were no complications.  During the procedure, a [employer] rep was present to manage a complex mapping system.    Findings of procedure: Successful isolation of the pulmonary veins in an antral circumferential approach. CTI ablation with bidirectional block. No acute reconnections.  Estimated blood loss: <5cc  Specimen removed: N/A  Preoperative Dx: Afib  Postoperative Dx: Afib  Complications: None  Anesthesia type: General good, to achieve stated therapy goals

## 2022-06-02 NOTE — PHYSICAL THERAPY INITIAL EVALUATION ADULT - BED MOBILITY TRAINING, PT EVAL
To be able to perform bed mobility including supine to sit, sit to supine Independently in order to facility safe transfers by 2-4 weeks

## 2022-06-02 NOTE — DISCHARGE NOTE PROVIDER - NSDCFUADDINST_GEN_ALL_CORE_FT
1.	Pain Control PRN, pain medications were sent to your pharmacy, please pick them up on your way home  2.	Walking with full weight bearing as tolerated, with assistive devices (walker/Cane as Needed)  3.	DVT Prophylaxis, Lovenox 40mg subcutaneous daily for 14 days. Day 15 start Aspirin 81 mg twice a day for 30 days. Do NOT skip doses.  4.	PT as needed  5.	Please call the office and make an appointment for suture/staple removal on post operative day 21. [phone]  6.	Remove Dressing Post-Op Day 10-14, with Dry dressing and Daily Dressing Changes as Need for saturation / strike through. Please keep clean dry and intact; If prevena in place, please remove day 5-7, replace with aquacel dressing  7.	Ice/Elevate affected area as Needed  8.	Keep Dressing Clean and dry.  9.     OK to shower w/ aquacel, DO NOT Submerge. No direct water contact. OK to wrap with Siran wrap for added protection

## 2022-06-02 NOTE — DISCHARGE NOTE PROVIDER - CARE PROVIDER_API CALL
Bartolo Harris)  Orthopaedic Surgery  [address], [address] [place] [address]  Phone: [phone]  Fax: [phone]  Follow Up Time:

## 2022-06-02 NOTE — PHYSICAL THERAPY INITIAL EVALUATION ADULT - CRITERIA FOR SKILLED THERAPEUTIC INTERVENTIONS
Acute rehab/impairments found/functional limitations in following categories/risk reduction/prevention/rehab potential/therapy frequency/predicted duration of therapy intervention/anticipated discharge recommendation

## 2022-06-02 NOTE — DISCHARGE NOTE PROVIDER - NSDCFUSCHEDAPPT_GEN_ALL_CORE_FT
Bartolo Harris  [facility]  ORTHOSURG [address]  Scheduled Appointment: 06/24/2022

## 2022-06-02 NOTE — PHYSICAL THERAPY INITIAL EVALUATION ADULT - ACTIVE RANGE OF MOTION EXAMINATION, REHAB EVAL
R knee flexion 75 degrees, extension - 10 degrees, L knee flexion 80 degrees, extension - 10 degrees/deficits as listed below

## 2022-06-02 NOTE — BRIEF OPERATIVE NOTE - NSICDXBRIEFPOSTOP_GEN_ALL_CORE_FT
POST-OP DIAGNOSIS:  Osteoarthritis of left knee 02-Jun-2022 16:30:10  Cleve Esparza  Osteoarthritis of right knee 02-Jun-2022 16:30:17  Cleve Esparza

## 2022-06-02 NOTE — BRIEF OPERATIVE NOTE - NSICDXBRIEFPREOP_GEN_ALL_CORE_FT
PRE-OP DIAGNOSIS:  Osteoarthritis of left knee 02-Jun-2022 16:29:38  Cleve Esparza  Osteoarthritis of right knee 02-Jun-2022 16:29:58  Cleve Esparza

## 2022-06-02 NOTE — DISCHARGE NOTE PROVIDER - NSDCMRMEDTOKEN_GEN_ALL_CORE_FT
Albuterol (Eqv-ProAir HFA) 90 mcg/inh inhalation aerosol: 2 puff(s) inhaled every 6 hours, As Needed  Symbicort:    acetaminophen 325 mg oral tablet: 3 tab(s) orally every 8 hours  Albuterol (Eqv-ProAir HFA) 90 mcg/inh inhalation aerosol: 2 puff(s) inhaled every 6 hours, As Needed  Aspirin Enteric Coated 81 mg oral delayed release tablet: 1 tab(s) orally 2 times a day   Colace 100 mg oral capsule: 1 cap(s) orally 2 times a day, As Needed -for constipation   enoxaparin: 40 nanogram(s) subcutaneous once a day, As Needed  enoxaparin 40 mg/0.4 mL injectable solution: 1 Injection 40mg subcutaneously once a day for 14 days until POD14.  HYDROmorphone: 0.5 milligram(s) injectable every 4 hours, As Needed  oxyCODONE 10 mg oral tablet: 1 tab(s) orally every 3 hours, As needed, Severe Pain (7 - 10)  oxyCODONE 5 mg oral tablet: 1 tab(s) orally every 3 hours, As needed, Moderate Pain (4 - 6)  oxyCODONE 5 mg oral tablet: 1 tab(s) orally every 6 hours MDD:MDD:4  Tylenol Extra Strength Cool 500 mg oral tablet: 1 tab(s) orally every 8 hours MDD:6 tabs   Ultram 50 mg oral tablet: 1 tab(s) orally every 6 hours, As needed, Mild Pain (1 - 3)  Zofran 4 mg oral tablet: 1 tab(s) orally every 6 hours    acetaminophen 325 mg oral tablet: 3 tab(s) orally every 8 hours  Albuterol (Eqv-ProAir HFA) 90 mcg/inh inhalation aerosol: 2 puff(s) inhaled every 6 hours, As Needed  Aspirin Enteric Coated 81 mg oral delayed release tablet: 1 tab(s) orally 2 times a day   Colace 100 mg oral capsule: 1 cap(s) orally 2 times a day, As Needed -for constipation   enoxaparin: 40 nanogram(s) subcutaneous once a day, As Needed  enoxaparin 40 mg/0.4 mL injectable solution: 1 Injection 40mg subcutaneously once a day for 14 days until POD14.  HYDROmorphone: 0.5 milligram(s) injectable every 4 hours, As Needed  metoprolol: 1 tab(s) orally 2 times a day  Hold for SBP&lt;120/HR &lt;60  oxyCODONE 10 mg oral tablet: 1 tab(s) orally every 3 hours, As needed, Severe Pain (7 - 10)  oxyCODONE 5 mg oral tablet: 1 tab(s) orally every 3 hours, As needed, Moderate Pain (4 - 6)  oxyCODONE 5 mg oral tablet: 1 tab(s) orally every 6 hours MDD:MDD:4  Tylenol Extra Strength Cool 500 mg oral tablet: 1 tab(s) orally every 8 hours MDD:6 tabs   Ultram 50 mg oral tablet: 1 tab(s) orally every 6 hours, As needed, Mild Pain (1 - 3)  Zofran 4 mg oral tablet: 1 tab(s) orally every 6 hours

## 2022-06-02 NOTE — DISCHARGE NOTE PROVIDER - HOSPITAL COURSE
The patient is a 36 year old Female status post elective total knee Arthroplasty to Bilateral knees after failing outpatient nonoperative conservative management. Patient presented to [facility] after being medically cleared for an elective surgical procedure. The patient was taken to the operating room on date mentioned above. Prophylactic antibiotics were started before the procedure and continued for 24 hours. There were no complications during the procedure and patient tolerated the procedure well. The patient was transferred to the recovery room in stable condition and subsequently to the surgical floor. The patient was placed on Lovenox for anticoagulation while in house to continue until POD 14, then starting POD 15 patient instructed to start ASA 81mg PO BID for a total of 30 days. All home medications were continued. The patient received physical therapy daily and daily labs were followed. The hemovac drains were DC'd on POD #1. The dressing was kept clean, dry, intact. The patient experienced Tachycardia in the post op period that was thought to be secondary to pain, the tachycardia resolved before discharge. The patient is a 36 year old Female status post elective total knee Arthroplasty to Bilateral knees after failing outpatient nonoperative conservative management. Patient presented to [facility] after being medically cleared for an elective surgical procedure. The patient was taken to the operating room on date mentioned above. Prophylactic antibiotics were started before the procedure and continued for 24 hours. There were no complications during the procedure and patient tolerated the procedure well. The patient was transferred to the recovery room in stable condition and subsequently to the surgical floor. The patient was placed on Lovenox for anticoagulation while in house to continue until POD 14, then starting POD 15 patient instructed to start ASA 81mg PO BID for a total of 30 days. All home medications were continued. The patient received physical therapy daily and daily labs were followed. The hemovac drains were DC'd on POD #1. The dressing was kept clean, dry, intact. The patient experienced Tachycardia in the post op period, most likely secondary to pain. The tachycardia resolved before discharge.  The patient was transfused 2 units of packed red blood cells on POD2 and a third unit on POD3 for anemia. The patients hemoglobin and hematocrit levels subsequently normalized and were stable on the day of discharge.

## 2022-06-02 NOTE — PROGRESS NOTE ADULT - SUBJECTIVE AND OBJECTIVE BOX
This is a 35y/o Female s/p Bilateral Total Knee Arthroplasty.  Pain is controlled. Pt feeling well. Mild nausea, no vomiting, improving with zofran.    Vital Signs Last 24 Hrs  T(C): 36.6 (06-02-22 @ 19:19), Max: 37.2 (06-02-22 @ 18:15)  T(F): 97.9 (06-02-22 @ 19:19), Max: 99 (06-02-22 @ 18:15)  HR: 112 (06-02-22 @ 19:19) (85 - 118)  BP: 113/77 (06-02-22 @ 19:19) (105/72 - 127/80)  BP(mean): --  RR: 16 (06-02-22 @ 19:19) (12 - 17)  SpO2: 93% (06-02-22 @ 19:19) (93% - 100%)                        13.8   11.79 )-----------( 192      ( 02 Jun 2022 17:06 )             41.0     02 Jun 2022 17:06    138    |  104    |  15     ----------------------------<  166    3.9     |  23     |  0.82     Ca    8.3        02 Jun 2022 17:06      PT/INR - ( 02 Jun 2022 12:05 )   PT: 12.3 sec;   INR: 1.03 ratio         PTT - ( 02 Jun 2022 12:05 )  PTT:45.4 sec    Exam:  NAD AAOx3.  BLLE  Dressing clean, dry and intact.  SCDs in place.  Calves are soft and nontender.  Moving all toes and ankle, +EHL/FHL/TA/GS.  SILT throughout.  DP pulses 2+.  HMV in place    A/P:  Stable POD 0 Bilateral Total Knee Arthroplasty  -DC HMVs POD1  -Analgesia  -BLLE elevation  -Ice application  -Prophylactic antibiotics  -DVT PE prophylaxis  -Incentive spirometry  -OOB PT WBAT

## 2022-06-02 NOTE — PHYSICAL THERAPY INITIAL EVALUATION ADULT - BALANCE TRAINING, PT EVAL
Pt will improve static/dynamic standing balance to WFL to perform all functional mobility without LOB and increase safety, by 2-4  weeks

## 2022-06-03 LAB
ANION GAP SERPL CALC-SCNC: 8 MMOL/L — SIGNIFICANT CHANGE UP (ref 5–17)
BUN SERPL-MCNC: 8 MG/DL — SIGNIFICANT CHANGE UP (ref 7–23)
CALCIUM SERPL-MCNC: 8.1 MG/DL — LOW (ref 8.5–10.1)
CHLORIDE SERPL-SCNC: 100 MMOL/L — SIGNIFICANT CHANGE UP (ref 96–108)
CO2 SERPL-SCNC: 25 MMOL/L — SIGNIFICANT CHANGE UP (ref 22–31)
CREAT SERPL-MCNC: 0.71 MG/DL — SIGNIFICANT CHANGE UP (ref 0.5–1.3)
EGFR: 112 ML/MIN/1.73M2 — SIGNIFICANT CHANGE UP
FLUAV AG NPH QL: SIGNIFICANT CHANGE UP
FLUBV AG NPH QL: SIGNIFICANT CHANGE UP
GLUCOSE SERPL-MCNC: 118 MG/DL — HIGH (ref 70–99)
HCT VFR BLD CALC: 31.7 % — LOW (ref 34.5–45)
HGB BLD-MCNC: 10.8 G/DL — LOW (ref 11.5–15.5)
MCHC RBC-ENTMCNC: 29.8 PG — SIGNIFICANT CHANGE UP (ref 27–34)
MCHC RBC-ENTMCNC: 34.1 G/DL — SIGNIFICANT CHANGE UP (ref 32–36)
MCV RBC AUTO: 87.3 FL — SIGNIFICANT CHANGE UP (ref 80–100)
NRBC # BLD: 0 /100 WBCS — SIGNIFICANT CHANGE UP (ref 0–0)
PLATELET # BLD AUTO: 173 K/UL — SIGNIFICANT CHANGE UP (ref 150–400)
POTASSIUM SERPL-MCNC: 4.2 MMOL/L — SIGNIFICANT CHANGE UP (ref 3.5–5.3)
POTASSIUM SERPL-SCNC: 4.2 MMOL/L — SIGNIFICANT CHANGE UP (ref 3.5–5.3)
RBC # BLD: 3.63 M/UL — LOW (ref 3.8–5.2)
RBC # FLD: 12.8 % — SIGNIFICANT CHANGE UP (ref 10.3–14.5)
SARS-COV-2 RNA SPEC QL NAA+PROBE: SIGNIFICANT CHANGE UP
SODIUM SERPL-SCNC: 133 MMOL/L — LOW (ref 135–145)
WBC # BLD: 11.36 K/UL — HIGH (ref 3.8–10.5)
WBC # FLD AUTO: 11.36 K/UL — HIGH (ref 3.8–10.5)

## 2022-06-03 RX ORDER — SODIUM CHLORIDE 9 MG/ML
1000 INJECTION, SOLUTION INTRAVENOUS ONCE
Refills: 0 | Status: COMPLETED | OUTPATIENT
Start: 2022-06-03 | End: 2022-06-03

## 2022-06-03 RX ORDER — ONDANSETRON 8 MG/1
1 TABLET, FILM COATED ORAL
Qty: 28 | Refills: 0
Start: 2022-06-03 | End: 2022-06-09

## 2022-06-03 RX ORDER — ACETAMINOPHEN 500 MG
1000 TABLET ORAL ONCE
Refills: 0 | Status: COMPLETED | OUTPATIENT
Start: 2022-06-03 | End: 2022-06-03

## 2022-06-03 RX ORDER — OXYCODONE HYDROCHLORIDE 5 MG/1
1 TABLET ORAL
Qty: 40 | Refills: 0
Start: 2022-06-03 | End: 2022-06-12

## 2022-06-03 RX ORDER — ASPIRIN/CALCIUM CARB/MAGNESIUM 324 MG
1 TABLET ORAL
Qty: 60 | Refills: 0
Start: 2022-06-03 | End: 2022-07-02

## 2022-06-03 RX ORDER — ACETAMINOPHEN 500 MG
1 TABLET ORAL
Qty: 30 | Refills: 0
Start: 2022-06-03 | End: 2022-06-12

## 2022-06-03 RX ORDER — ENOXAPARIN SODIUM 100 MG/ML
1 INJECTION SUBCUTANEOUS
Qty: 14 | Refills: 0
Start: 2022-06-03 | End: 2022-06-16

## 2022-06-03 RX ORDER — DOCUSATE SODIUM 100 MG
1 CAPSULE ORAL
Qty: 14 | Refills: 0
Start: 2022-06-03 | End: 2022-06-09

## 2022-06-03 RX ORDER — KETOROLAC TROMETHAMINE 30 MG/ML
15 SYRINGE (ML) INJECTION ONCE
Refills: 0 | Status: DISCONTINUED | OUTPATIENT
Start: 2022-06-03 | End: 2022-06-03

## 2022-06-03 RX ORDER — BUDESONIDE AND FORMOTEROL FUMARATE DIHYDRATE 160; 4.5 UG/1; UG/1
0 AEROSOL RESPIRATORY (INHALATION)
Qty: 0 | Refills: 0 | DISCHARGE

## 2022-06-03 RX ORDER — BENZOCAINE AND MENTHOL 5; 1 G/100ML; G/100ML
1 LIQUID ORAL
Refills: 0 | Status: DISCONTINUED | OUTPATIENT
Start: 2022-06-03 | End: 2022-06-09

## 2022-06-03 RX ADMIN — OXYCODONE HYDROCHLORIDE 10 MILLIGRAM(S): 5 TABLET ORAL at 00:30

## 2022-06-03 RX ADMIN — OXYCODONE HYDROCHLORIDE 10 MILLIGRAM(S): 5 TABLET ORAL at 22:29

## 2022-06-03 RX ADMIN — OXYCODONE HYDROCHLORIDE 5 MILLIGRAM(S): 5 TABLET ORAL at 14:40

## 2022-06-03 RX ADMIN — TRAMADOL HYDROCHLORIDE 50 MILLIGRAM(S): 50 TABLET ORAL at 13:00

## 2022-06-03 RX ADMIN — Medication 15 MILLIGRAM(S): at 08:16

## 2022-06-03 RX ADMIN — Medication 975 MILLIGRAM(S): at 23:30

## 2022-06-03 RX ADMIN — OXYCODONE HYDROCHLORIDE 10 MILLIGRAM(S): 5 TABLET ORAL at 06:45

## 2022-06-03 RX ADMIN — SODIUM CHLORIDE 1000 MILLILITER(S): 9 INJECTION, SOLUTION INTRAVENOUS at 06:35

## 2022-06-03 RX ADMIN — Medication 15 MILLIGRAM(S): at 07:36

## 2022-06-03 RX ADMIN — POLYETHYLENE GLYCOL 3350 17 GRAM(S): 17 POWDER, FOR SOLUTION ORAL at 22:30

## 2022-06-03 RX ADMIN — ENOXAPARIN SODIUM 40 MILLIGRAM(S): 100 INJECTION SUBCUTANEOUS at 08:53

## 2022-06-03 RX ADMIN — Medication 400 MILLIGRAM(S): at 02:05

## 2022-06-03 RX ADMIN — Medication 1 TABLET(S): at 11:56

## 2022-06-03 RX ADMIN — Medication 1000 MILLIGRAM(S): at 15:30

## 2022-06-03 RX ADMIN — Medication 975 MILLIGRAM(S): at 22:30

## 2022-06-03 RX ADMIN — Medication 400 MILLIGRAM(S): at 14:40

## 2022-06-03 RX ADMIN — SENNA PLUS 2 TABLET(S): 8.6 TABLET ORAL at 22:30

## 2022-06-03 RX ADMIN — OXYCODONE HYDROCHLORIDE 10 MILLIGRAM(S): 5 TABLET ORAL at 23:29

## 2022-06-03 RX ADMIN — CELECOXIB 200 MILLIGRAM(S): 200 CAPSULE ORAL at 06:45

## 2022-06-03 RX ADMIN — OXYCODONE HYDROCHLORIDE 10 MILLIGRAM(S): 5 TABLET ORAL at 17:43

## 2022-06-03 RX ADMIN — BENZOCAINE AND MENTHOL 1 LOZENGE: 5; 1 LIQUID ORAL at 07:01

## 2022-06-03 RX ADMIN — Medication 1 MILLIGRAM(S): at 11:56

## 2022-06-03 RX ADMIN — ONDANSETRON 4 MILLIGRAM(S): 8 TABLET, FILM COATED ORAL at 02:16

## 2022-06-03 RX ADMIN — Medication 100 MILLIGRAM(S): at 05:45

## 2022-06-03 RX ADMIN — OXYCODONE HYDROCHLORIDE 10 MILLIGRAM(S): 5 TABLET ORAL at 18:00

## 2022-06-03 RX ADMIN — OXYCODONE HYDROCHLORIDE 10 MILLIGRAM(S): 5 TABLET ORAL at 05:45

## 2022-06-03 RX ADMIN — Medication 1000 MILLIGRAM(S): at 02:35

## 2022-06-03 RX ADMIN — CELECOXIB 200 MILLIGRAM(S): 200 CAPSULE ORAL at 17:43

## 2022-06-03 RX ADMIN — CELECOXIB 200 MILLIGRAM(S): 200 CAPSULE ORAL at 16:58

## 2022-06-03 RX ADMIN — PANTOPRAZOLE SODIUM 40 MILLIGRAM(S): 20 TABLET, DELAYED RELEASE ORAL at 05:45

## 2022-06-03 RX ADMIN — OXYCODONE HYDROCHLORIDE 5 MILLIGRAM(S): 5 TABLET ORAL at 15:30

## 2022-06-03 RX ADMIN — SODIUM CHLORIDE 75 MILLILITER(S): 9 INJECTION, SOLUTION INTRAVENOUS at 05:46

## 2022-06-03 RX ADMIN — TRAMADOL HYDROCHLORIDE 50 MILLIGRAM(S): 50 TABLET ORAL at 11:56

## 2022-06-03 RX ADMIN — CELECOXIB 200 MILLIGRAM(S): 200 CAPSULE ORAL at 05:45

## 2022-06-03 NOTE — CONSULT NOTE ADULT - SUBJECTIVE AND OBJECTIVE BOX
Chief Complaint:  Patient is a 36y old  Female who presents with a chief complaint of BL TKA (03 Jun 2022 06:24)      HPI: Currently no sob or chest pain or palpitation . No current cough or fever . Voiding . Slight nausea , Pain controlled .      Review of Systems:    General:  No wt loss, fevers, chills, night sweats  Eyes:  Good vision, no reported pain  ENT:  No sore throat, pain, runny nose, dysphagia  CV:  No pain, palpitations, hypo/hypertension  Resp:  No dyspnea, cough, tachypnea, wheezing  GI:  No pain, nausea, vomiting, diarrhea, constipation  :  No pain, bleeding, incontinence, nocturia  Muscle:  No pain, weakness  Breast:  No pain, abscess, mass, discharge  Neuro:  No weakness, tingling, memory problems  Psych:  No fatigue, insomnia, mood problems, depression  Endocrine:  No polyuria, polydypsia, cold/heat intolerance  Heme:  No petechiae, ecchymosis, easy bruisability  Skin:  No rash, tattoos, scars, edema    Relevant Family History: NC . Allergy : Shrimp .      Relevant Social History: Quit smoking .    PMH : Asthma . Migraine . Meds : reviewed .    Physical Exam:      Vital Signs:  Vital Signs Last 24 Hrs  T(C): 37.1 (03 Jun 2022 03:25), Max: 37.2 (02 Jun 2022 18:15)  T(F): 98.7 (03 Jun 2022 03:25), Max: 99 (02 Jun 2022 18:15)  HR: 111 (03 Jun 2022 03:25) (85 - 118)  BP: 115/78 (03 Jun 2022 03:25) (103/67 - 127/81)  BP(mean): --  RR: 17 (03 Jun 2022 03:25) (12 - 18)  SpO2: 99% (03 Jun 2022 03:25) (91% - 100%)  Daily Height in cm: 165.1 (02 Jun 2022 12:37)    Daily   I&O's Summary    02 Jun 2022 07:01  -  03 Jun 2022 07:00  --------------------------------------------------------  IN: 1400 mL / OUT: 80 mL / NET: 1320 mL        General:  Appears stated age, well-groomed, well-nourished, no distress  HEENT:  NC/AT, patent nares w/ pink mucosa, OP clear w/o lesions, PERRL, EOMI, conjunctivae clear, no thyromegaly, nodules, adenopathy, no JVD  Chest:  Full & symmetric excursion, no increased effort, breath sounds clear  Cardiovascular:  Regular rhythm, S1, S2, no murmur/rub/S3/S4, no carotid/femoral/abdominal bruit, radial/pedal pulses 2+, no edema  Abdomen:  Soft, non-tender, non-distended, normoactive bowel sounds, no HSM  Extremities: Slight fulness B/L knees . + Pulse .  Skin:  No rash/erythema/ecchymoses/petechiae/wounds/abscess/warm/dry  Musculoskeletal: Intact .  Neuro/Psych:  Alert, oriented, normal and symmetric strength in UEs, LEs .    Laboratory:                            10.8   11.36 )-----------( 173      ( 03 Jun 2022 07:03 )             31.7     06-03    133<L>  |  100  |  8   ----------------------------<  118<H>  4.2   |  25  |  0.71    Ca    8.1<L>      03 Jun 2022 07:03            CAPILLARY BLOOD GLUCOSE          PT/INR - ( 02 Jun 2022 20:35 )   PT: 13.0 sec;   INR: 1.09 ratio         PTT - ( 02 Jun 2022 20:35 )  PTT:36.3 sec      Imaging:      Assessment: S/P B/L knee replacement . Sinus tachycardia .      Plan: PT/OT . Pain control . DVT prophylaxis . Incentive spirometry . Sinus tach ? sympathetic response with pain + Fluid shifts with blood lose . Pre-Op Hgb 14.1 currently 10.6 . Monitor . On IV fluids . If required transfuse . D/W the patient .

## 2022-06-03 NOTE — PROGRESS NOTE ADULT - SUBJECTIVE AND OBJECTIVE BOX
This is a 35y/o Female s/p Bilateral Total Knee Arthroplasty.  Pain is controlled. Pt feeling well. Patient mild tachycardic this AM. Asymptomatic.    LABS:                        12.0   14.19 )-----------( 202      ( 02 Jun 2022 20:35 )             34.7     06-02    138  |  104  |  15  ----------------------------<  166<H>  3.9   |  23  |  0.82    Ca    8.3<L>      02 Jun 2022 17:06      PT/INR - ( 02 Jun 2022 20:35 )   PT: 13.0 sec;   INR: 1.09 ratio         PTT - ( 02 Jun 2022 20:35 )  PTT:36.3 sec      VITAL SIGNS:  T(C): 37.1 (06-03-22 @ 03:25), Max: 37.2 (06-02-22 @ 18:15)  HR: 111 (06-03-22 @ 03:25) (85 - 118)  BP: 115/78 (06-03-22 @ 03:25) (103/67 - 127/81)  RR: 17 (06-03-22 @ 03:25) (12 - 18)  SpO2: 99% (06-03-22 @ 03:25) (91% - 100%)    Exam:  NAD AAOx3.  BLLE  Dressing clean, dry and intact.  SCDs in place.  Calves are soft and nontender.  Moving all toes and ankle, +EHL/FHL/TA/GS.  SILT throughout.  DP pulses 2+.  HMV in place    A/P:  Stable POD 1 Bilateral Total Knee Arthroplasty  -DC'd HMVs POD1  -Analgesia  -BLLE elevation  -Ice application  -Prophylactic antibiotics  -DVT PE prophylaxis  -Incentive spirometry  -OOB PT WBAT  -PT rec AR; pending auth

## 2022-06-04 LAB
ANION GAP SERPL CALC-SCNC: 3 MMOL/L — LOW (ref 5–17)
BUN SERPL-MCNC: 6 MG/DL — LOW (ref 7–23)
CALCIUM SERPL-MCNC: 8.1 MG/DL — LOW (ref 8.5–10.1)
CHLORIDE SERPL-SCNC: 107 MMOL/L — SIGNIFICANT CHANGE UP (ref 96–108)
CO2 SERPL-SCNC: 32 MMOL/L — HIGH (ref 22–31)
CREAT SERPL-MCNC: 0.58 MG/DL — SIGNIFICANT CHANGE UP (ref 0.5–1.3)
EGFR: 119 ML/MIN/1.73M2 — SIGNIFICANT CHANGE UP
GLUCOSE SERPL-MCNC: 118 MG/DL — HIGH (ref 70–99)
HCT VFR BLD CALC: 26.5 % — LOW (ref 34.5–45)
HGB BLD-MCNC: 8.9 G/DL — LOW (ref 11.5–15.5)
MCHC RBC-ENTMCNC: 30 PG — SIGNIFICANT CHANGE UP (ref 27–34)
MCHC RBC-ENTMCNC: 33.6 G/DL — SIGNIFICANT CHANGE UP (ref 32–36)
MCV RBC AUTO: 89.2 FL — SIGNIFICANT CHANGE UP (ref 80–100)
NRBC # BLD: 0 /100 WBCS — SIGNIFICANT CHANGE UP (ref 0–0)
PLATELET # BLD AUTO: 138 K/UL — LOW (ref 150–400)
POTASSIUM SERPL-MCNC: 4.2 MMOL/L — SIGNIFICANT CHANGE UP (ref 3.5–5.3)
POTASSIUM SERPL-SCNC: 4.2 MMOL/L — SIGNIFICANT CHANGE UP (ref 3.5–5.3)
RBC # BLD: 2.97 M/UL — LOW (ref 3.8–5.2)
RBC # FLD: 13.6 % — SIGNIFICANT CHANGE UP (ref 10.3–14.5)
SODIUM SERPL-SCNC: 142 MMOL/L — SIGNIFICANT CHANGE UP (ref 135–145)
WBC # BLD: 7.25 K/UL — SIGNIFICANT CHANGE UP (ref 3.8–10.5)
WBC # FLD AUTO: 7.25 K/UL — SIGNIFICANT CHANGE UP (ref 3.8–10.5)

## 2022-06-04 PROCEDURE — 73560 X-RAY EXAM OF KNEE 1 OR 2: CPT | Mod: 26,50

## 2022-06-04 RX ORDER — TRAMADOL HYDROCHLORIDE 50 MG/1
1 TABLET ORAL
Qty: 0 | Refills: 0 | DISCHARGE
Start: 2022-06-04

## 2022-06-04 RX ORDER — OXYCODONE HYDROCHLORIDE 5 MG/1
1 TABLET ORAL
Qty: 0 | Refills: 0 | DISCHARGE
Start: 2022-06-04

## 2022-06-04 RX ORDER — HYDROMORPHONE HYDROCHLORIDE 2 MG/ML
0.5 INJECTION INTRAMUSCULAR; INTRAVENOUS; SUBCUTANEOUS
Qty: 0 | Refills: 0 | DISCHARGE
Start: 2022-06-04

## 2022-06-04 RX ORDER — ACETAMINOPHEN 500 MG
3 TABLET ORAL
Qty: 0 | Refills: 0 | DISCHARGE
Start: 2022-06-04

## 2022-06-04 RX ORDER — ACETAMINOPHEN 500 MG
1000 TABLET ORAL ONCE
Refills: 0 | Status: COMPLETED | OUTPATIENT
Start: 2022-06-05 | End: 2022-06-05

## 2022-06-04 RX ORDER — KETOROLAC TROMETHAMINE 30 MG/ML
15 SYRINGE (ML) INJECTION ONCE
Refills: 0 | Status: DISCONTINUED | OUTPATIENT
Start: 2022-06-04 | End: 2022-06-05

## 2022-06-04 RX ADMIN — Medication 1 TABLET(S): at 11:53

## 2022-06-04 RX ADMIN — POLYETHYLENE GLYCOL 3350 17 GRAM(S): 17 POWDER, FOR SOLUTION ORAL at 21:07

## 2022-06-04 RX ADMIN — PANTOPRAZOLE SODIUM 40 MILLIGRAM(S): 20 TABLET, DELAYED RELEASE ORAL at 06:32

## 2022-06-04 RX ADMIN — SENNA PLUS 2 TABLET(S): 8.6 TABLET ORAL at 21:07

## 2022-06-04 RX ADMIN — Medication 1 MILLIGRAM(S): at 11:53

## 2022-06-04 RX ADMIN — CELECOXIB 200 MILLIGRAM(S): 200 CAPSULE ORAL at 17:07

## 2022-06-04 RX ADMIN — Medication 975 MILLIGRAM(S): at 14:40

## 2022-06-04 RX ADMIN — ENOXAPARIN SODIUM 40 MILLIGRAM(S): 100 INJECTION SUBCUTANEOUS at 07:48

## 2022-06-04 RX ADMIN — OXYCODONE HYDROCHLORIDE 10 MILLIGRAM(S): 5 TABLET ORAL at 17:07

## 2022-06-04 RX ADMIN — OXYCODONE HYDROCHLORIDE 10 MILLIGRAM(S): 5 TABLET ORAL at 05:25

## 2022-06-04 RX ADMIN — Medication 975 MILLIGRAM(S): at 06:32

## 2022-06-04 RX ADMIN — OXYCODONE HYDROCHLORIDE 10 MILLIGRAM(S): 5 TABLET ORAL at 10:45

## 2022-06-04 RX ADMIN — OXYCODONE HYDROCHLORIDE 10 MILLIGRAM(S): 5 TABLET ORAL at 13:45

## 2022-06-04 RX ADMIN — CELECOXIB 200 MILLIGRAM(S): 200 CAPSULE ORAL at 07:30

## 2022-06-04 RX ADMIN — CELECOXIB 200 MILLIGRAM(S): 200 CAPSULE ORAL at 18:00

## 2022-06-04 RX ADMIN — CELECOXIB 200 MILLIGRAM(S): 200 CAPSULE ORAL at 06:33

## 2022-06-04 RX ADMIN — OXYCODONE HYDROCHLORIDE 10 MILLIGRAM(S): 5 TABLET ORAL at 20:55

## 2022-06-04 RX ADMIN — Medication 975 MILLIGRAM(S): at 07:30

## 2022-06-04 RX ADMIN — OXYCODONE HYDROCHLORIDE 10 MILLIGRAM(S): 5 TABLET ORAL at 12:52

## 2022-06-04 RX ADMIN — OXYCODONE HYDROCHLORIDE 10 MILLIGRAM(S): 5 TABLET ORAL at 18:00

## 2022-06-04 RX ADMIN — OXYCODONE HYDROCHLORIDE 10 MILLIGRAM(S): 5 TABLET ORAL at 21:40

## 2022-06-04 RX ADMIN — OXYCODONE HYDROCHLORIDE 10 MILLIGRAM(S): 5 TABLET ORAL at 04:25

## 2022-06-04 RX ADMIN — Medication 975 MILLIGRAM(S): at 21:40

## 2022-06-04 RX ADMIN — Medication 975 MILLIGRAM(S): at 21:07

## 2022-06-04 RX ADMIN — Medication 975 MILLIGRAM(S): at 13:42

## 2022-06-04 RX ADMIN — OXYCODONE HYDROCHLORIDE 10 MILLIGRAM(S): 5 TABLET ORAL at 09:47

## 2022-06-04 NOTE — OCCUPATIONAL THERAPY INITIAL EVALUATION ADULT - GENERAL OBSERVATIONS, REHAB EVAL
Chart reviewed and event noted to date. Patient encountered supine in bed, NAD, A&Ox4, WBAT BLE, +dressing to B/L knees C/D/I, +IV heplock, +SCDs, +cryo cuff, +cardiac monitor. PT Rinku bedside. Patient c/o pain in L knee 7/10 at rest & 8/10 w/movement and pain in R knee 6/10 at rest & 8/10 w/movement s/p B/L TKA.

## 2022-06-04 NOTE — OCCUPATIONAL THERAPY INITIAL EVALUATION ADULT - BALANCE TRAINING, PT EVAL
Patient will increase static/dynamic standing balance to good in order to perform toilet transfer independently within 4 weeks

## 2022-06-04 NOTE — OCCUPATIONAL THERAPY INITIAL EVALUATION ADULT - ADDITIONAL COMMENTS
Pre-op confirmed: Patient lives with her spouse and 3 children in a private house with house with 5 entry steps without any handrail. The side entrance has 4 steps with left ascending handrail. Once inside, pt has to negotiate a flight of stairs  with 15 steps  and right ascending handrail 2/3 of the way, to access the bedroom and bathroom. The home is equipped with 2 bathrooms. Each bathroom has a tub/shower combination, fixed showerhead and standard toilet with adequate space to fit a commode over it. Presently, pt is functioning in her roles, self sufficient, driving & ambulating independently in the community without any assistive devices.

## 2022-06-04 NOTE — PROGRESS NOTE ADULT - SUBJECTIVE AND OBJECTIVE BOX
Orthopedics     Pt seen and examined at the bedside. Pain is well controlled at this time. Pt reports feeling well, Tachycardia resolved.    Vital Signs Last 24 Hrs  T(C): 36.7 (06-04-22 @ 05:00), Max: 36.9 (06-03-22 @ 16:06)  T(F): 98 (06-04-22 @ 05:00), Max: 98.5 (06-03-22 @ 16:06)  HR: 99 (06-04-22 @ 05:00) (99 - 109)  BP: 120/83 (06-04-22 @ 05:00) (115/83 - 126/81)  BP(mean): --  RR: 18 (06-04-22 @ 05:00) (16 - 18)  SpO2: 97% (06-04-22 @ 05:00) (96% - 98%)                        10.8   11.36 )-----------( 173      ( 03 Jun 2022 07:03 )             31.7     03 Jun 2022 07:03    133    |  100    |  8      ----------------------------<  118    4.2     |  25     |  0.71     Ca    8.1        03 Jun 2022 07:03      PT/INR - ( 02 Jun 2022 20:35 )   PT: 13.0 sec;   INR: 1.09 ratio         PTT - ( 02 Jun 2022 20:35 )  PTT:36.3 sec    Exam:  GEN: NAD, awake and alert.  RLE/BLE  Dressing clean and dry  +EHL FHL TA GS  SILT L2-S1  +DP  Calf soft and nontender, compartments soft and compressible.      A/P:  36yF s/p B/l Total Knee Arthroplasty POD #2    -Pain control prn  - DVT ppx  - WBAT/PT/OOB as tolerated   - FU am labs  - Med mgmt, continue home meds.  -Begin D/C planning if stable and progressing well with PT.

## 2022-06-05 LAB
ANION GAP SERPL CALC-SCNC: 4 MMOL/L — LOW (ref 5–17)
APTT BLD: 37.4 SEC — HIGH (ref 27.5–35.5)
BUN SERPL-MCNC: 4 MG/DL — LOW (ref 7–23)
CALCIUM SERPL-MCNC: 8.1 MG/DL — LOW (ref 8.5–10.1)
CHLORIDE SERPL-SCNC: 105 MMOL/L — SIGNIFICANT CHANGE UP (ref 96–108)
CO2 SERPL-SCNC: 31 MMOL/L — SIGNIFICANT CHANGE UP (ref 22–31)
CREAT SERPL-MCNC: 0.52 MG/DL — SIGNIFICANT CHANGE UP (ref 0.5–1.3)
EGFR: 122 ML/MIN/1.73M2 — SIGNIFICANT CHANGE UP
GLUCOSE SERPL-MCNC: 101 MG/DL — HIGH (ref 70–99)
HCT VFR BLD CALC: 29 % — LOW (ref 34.5–45)
HGB BLD-MCNC: 9.9 G/DL — LOW (ref 11.5–15.5)
INR BLD: 1.06 RATIO — SIGNIFICANT CHANGE UP (ref 0.88–1.16)
MCHC RBC-ENTMCNC: 30.1 PG — SIGNIFICANT CHANGE UP (ref 27–34)
MCHC RBC-ENTMCNC: 34.1 G/DL — SIGNIFICANT CHANGE UP (ref 32–36)
MCV RBC AUTO: 88.1 FL — SIGNIFICANT CHANGE UP (ref 80–100)
NRBC # BLD: 0 /100 WBCS — SIGNIFICANT CHANGE UP (ref 0–0)
PLATELET # BLD AUTO: 141 K/UL — LOW (ref 150–400)
POTASSIUM SERPL-MCNC: 4.4 MMOL/L — SIGNIFICANT CHANGE UP (ref 3.5–5.3)
POTASSIUM SERPL-SCNC: 4.4 MMOL/L — SIGNIFICANT CHANGE UP (ref 3.5–5.3)
PROTHROM AB SERPL-ACNC: 12.6 SEC — SIGNIFICANT CHANGE UP (ref 10.5–13.4)
RBC # BLD: 3.29 M/UL — LOW (ref 3.8–5.2)
RBC # FLD: 13.6 % — SIGNIFICANT CHANGE UP (ref 10.3–14.5)
SODIUM SERPL-SCNC: 140 MMOL/L — SIGNIFICANT CHANGE UP (ref 135–145)
WBC # BLD: 7.02 K/UL — SIGNIFICANT CHANGE UP (ref 3.8–10.5)
WBC # FLD AUTO: 7.02 K/UL — SIGNIFICANT CHANGE UP (ref 3.8–10.5)

## 2022-06-05 PROCEDURE — 93010 ELECTROCARDIOGRAM REPORT: CPT

## 2022-06-05 RX ORDER — HYDROMORPHONE HYDROCHLORIDE 2 MG/ML
0.5 INJECTION INTRAMUSCULAR; INTRAVENOUS; SUBCUTANEOUS ONCE
Refills: 0 | Status: DISCONTINUED | OUTPATIENT
Start: 2022-06-05 | End: 2022-06-05

## 2022-06-05 RX ORDER — FERROUS SULFATE 325(65) MG
325 TABLET ORAL DAILY
Refills: 0 | Status: DISCONTINUED | OUTPATIENT
Start: 2022-06-05 | End: 2022-06-09

## 2022-06-05 RX ADMIN — OXYCODONE HYDROCHLORIDE 10 MILLIGRAM(S): 5 TABLET ORAL at 00:07

## 2022-06-05 RX ADMIN — ENOXAPARIN SODIUM 40 MILLIGRAM(S): 100 INJECTION SUBCUTANEOUS at 07:38

## 2022-06-05 RX ADMIN — OXYCODONE HYDROCHLORIDE 10 MILLIGRAM(S): 5 TABLET ORAL at 03:15

## 2022-06-05 RX ADMIN — Medication 400 MILLIGRAM(S): at 06:15

## 2022-06-05 RX ADMIN — Medication 325 MILLIGRAM(S): at 11:08

## 2022-06-05 RX ADMIN — CELECOXIB 200 MILLIGRAM(S): 200 CAPSULE ORAL at 18:00

## 2022-06-05 RX ADMIN — OXYCODONE HYDROCHLORIDE 10 MILLIGRAM(S): 5 TABLET ORAL at 20:40

## 2022-06-05 RX ADMIN — OXYCODONE HYDROCHLORIDE 10 MILLIGRAM(S): 5 TABLET ORAL at 11:23

## 2022-06-05 RX ADMIN — Medication 975 MILLIGRAM(S): at 21:28

## 2022-06-05 RX ADMIN — OXYCODONE HYDROCHLORIDE 10 MILLIGRAM(S): 5 TABLET ORAL at 10:35

## 2022-06-05 RX ADMIN — CELECOXIB 200 MILLIGRAM(S): 200 CAPSULE ORAL at 06:41

## 2022-06-05 RX ADMIN — Medication 15 MILLIGRAM(S): at 00:43

## 2022-06-05 RX ADMIN — Medication 15 MILLIGRAM(S): at 00:06

## 2022-06-05 RX ADMIN — OXYCODONE HYDROCHLORIDE 10 MILLIGRAM(S): 5 TABLET ORAL at 06:41

## 2022-06-05 RX ADMIN — Medication 975 MILLIGRAM(S): at 13:00

## 2022-06-05 RX ADMIN — POLYETHYLENE GLYCOL 3350 17 GRAM(S): 17 POWDER, FOR SOLUTION ORAL at 21:28

## 2022-06-05 RX ADMIN — Medication 1000 MILLIGRAM(S): at 06:41

## 2022-06-05 RX ADMIN — OXYCODONE HYDROCHLORIDE 10 MILLIGRAM(S): 5 TABLET ORAL at 15:55

## 2022-06-05 RX ADMIN — Medication 1 TABLET(S): at 11:08

## 2022-06-05 RX ADMIN — OXYCODONE HYDROCHLORIDE 10 MILLIGRAM(S): 5 TABLET ORAL at 00:43

## 2022-06-05 RX ADMIN — HYDROMORPHONE HYDROCHLORIDE 0.5 MILLIGRAM(S): 2 INJECTION INTRAMUSCULAR; INTRAVENOUS; SUBCUTANEOUS at 13:15

## 2022-06-05 RX ADMIN — CELECOXIB 200 MILLIGRAM(S): 200 CAPSULE ORAL at 17:07

## 2022-06-05 RX ADMIN — CELECOXIB 200 MILLIGRAM(S): 200 CAPSULE ORAL at 06:16

## 2022-06-05 RX ADMIN — SENNA PLUS 2 TABLET(S): 8.6 TABLET ORAL at 21:28

## 2022-06-05 RX ADMIN — Medication 975 MILLIGRAM(S): at 22:07

## 2022-06-05 RX ADMIN — OXYCODONE HYDROCHLORIDE 10 MILLIGRAM(S): 5 TABLET ORAL at 16:46

## 2022-06-05 RX ADMIN — MAGNESIUM HYDROXIDE 30 MILLILITER(S): 400 TABLET, CHEWABLE ORAL at 11:16

## 2022-06-05 RX ADMIN — HYDROMORPHONE HYDROCHLORIDE 0.5 MILLIGRAM(S): 2 INJECTION INTRAMUSCULAR; INTRAVENOUS; SUBCUTANEOUS at 13:00

## 2022-06-05 RX ADMIN — OXYCODONE HYDROCHLORIDE 10 MILLIGRAM(S): 5 TABLET ORAL at 19:43

## 2022-06-05 RX ADMIN — Medication 975 MILLIGRAM(S): at 13:46

## 2022-06-05 RX ADMIN — Medication 1 MILLIGRAM(S): at 11:08

## 2022-06-05 RX ADMIN — PANTOPRAZOLE SODIUM 40 MILLIGRAM(S): 20 TABLET, DELAYED RELEASE ORAL at 06:16

## 2022-06-05 RX ADMIN — OXYCODONE HYDROCHLORIDE 10 MILLIGRAM(S): 5 TABLET ORAL at 04:00

## 2022-06-05 RX ADMIN — OXYCODONE HYDROCHLORIDE 10 MILLIGRAM(S): 5 TABLET ORAL at 06:17

## 2022-06-05 NOTE — PROGRESS NOTE ADULT - SUBJECTIVE AND OBJECTIVE BOX
Chief Complaint:  Patient is a 36y old  Female who presents with a chief complaint of BL TKA (03 Jun 2022 06:24)      HPI: Came in for follow up . No current sob or chest pain or palpitation . + Tachycardia on exertion . + Pain . Voiding . No Hypoxia .       Review of Systems:    General:  No wt loss, fevers, chills, night sweats  Eyes:  Good vision, no reported pain  ENT:  No sore throat, pain, runny nose, dysphagia  CV:  No pain, palpitations, hypo/hypertension  Resp:  No dyspnea, cough, tachypnea, wheezing  GI:  No pain, nausea, vomiting, diarrhea, constipation  :  No pain, bleeding, incontinence, nocturia  Muscle:  No pain, weakness  Breast:  No pain, abscess, mass, discharge  Neuro:  No weakness, tingling, memory problems  Psych:  No fatigue, insomnia, mood problems, depression  Endocrine:  No polyuria, polydypsia, cold/heat intolerance  Heme:  No petechiae, ecchymosis, easy bruisability  Skin:  No rash, tattoos, scars, edema      Physical Exam:      Vital Signs:  Vital Signs Last 24 Hrs  T(C): 36.7 (05 Jun 2022 11:35), Max: 37.7 (04 Jun 2022 19:49)  T(F): 98.1 (05 Jun 2022 11:35), Max: 99.8 (04 Jun 2022 19:49)  HR: 96 (05 Jun 2022 11:35) (93 - 116)  BP: 122/84 (05 Jun 2022 11:35) (104/67 - 127/89)  BP(mean): --  RR: 17 (05 Jun 2022 11:35) (15 - 18)  SpO2: 98% (05 Jun 2022 11:35) (94% - 100%)  Daily     Daily   I&O's Summary    04 Jun 2022 07:01  -  05 Jun 2022 07:00  --------------------------------------------------------  IN: 250 mL / OUT: 0 mL / NET: 250 mL        General:  Appears stated age, well-groomed, well-nourished, no distress  HEENT:  NC/AT, patent nares w/ pink mucosa, OP clear w/o lesions, PERRL, EOMI, conjunctivae clear, no thyromegaly, nodules, adenopathy, no JVD  Chest:  Full & symmetric excursion, no increased effort, breath sounds clear  Cardiovascular:  Regular rhythm, S1, S2, no murmur/rub/S3/S4, no carotid/femoral/abdominal bruit, radial/pedal pulses 2+, no edema  Abdomen:  Soft, non-tender, non-distended, normoactive bowel sounds, no HSM  Extremities: B/L fulness of the knees . + Pulse .  Skin:  No rash/erythema/ecchymoses/petechiae/wounds/abscess/warm/dry  Musculoskeletal: Intact .  Neuro/Psych:  Alert, oriented, normal and symmetric strength in UEs, LEs .    Laboratory:                            9.9    7.02  )-----------( 141      ( 05 Jun 2022 05:44 )             29.0     06-05    140  |  105  |  4<L>  ----------------------------<  101<H>  4.4   |  31  |  0.52    Ca    8.1<L>      05 Jun 2022 05:44            CAPILLARY BLOOD GLUCOSE          PT/INR - ( 05 Jun 2022 05:44 )   PT: 12.6 sec;   INR: 1.06 ratio         PTT - ( 05 Jun 2022 05:44 )  PTT:37.4 sec      Imaging:      Assessment: S/P B/L Knee replacement . Tachycardia on exertion .      Plan: Current tachycardia likely physiological response to blood lose with Hgb dropping from 13.7 to 8.6 . Currently post transfusion of 2 units of PRBC . Also could be secondary to sympathetic response to pain . Will transfuse one more unit . Monitor . D/W the orthopedics . D/W the patient and nursing . Pain control . DVT prophylaxis . No hypoxia .

## 2022-06-05 NOTE — PROGRESS NOTE ADULT - SUBJECTIVE AND OBJECTIVE BOX
Orthopedics     Pt seen and examined at the bedside. Pain is well controlled at this time. Pt reports feeling dizziness after attempting bowel movement, walked to chair and laid down. States dizziness was not while standing or ambulating but when in bed.    LABS:                        9.9    7.02  )-----------( 141      ( 05 Jun 2022 05:44 )             29.0     06-05    140  |  105  |  4<L>  ----------------------------<  101<H>  4.4   |  31  |  0.52    Ca    8.1<L>      05 Jun 2022 05:44      PT/INR - ( 05 Jun 2022 05:44 )   PT: 12.6 sec;   INR: 1.06 ratio         PTT - ( 05 Jun 2022 05:44 )  PTT:37.4 sec      VITAL SIGNS:  T(C): 36.8 (06-05-22 @ 07:17), Max: 37.7 (06-04-22 @ 19:49)  HR: 93 (06-05-22 @ 07:17) (93 - 124)  BP: 117/81 (06-05-22 @ 07:17) (104/67 - 134/85)  RR: 17 (06-05-22 @ 07:17) (15 - 18)  SpO2: 100% (06-05-22 @ 07:17) (94% - 100%)    Exam:  GEN: NAD, awake and alert.  RLE/BLE  Dressing clean and dry  +EHL FHL TA GS  SILT L2-S1  +DP  Calf soft and nontender, compartments soft and compressible.      A/P:  36yF s/p B/l Total Knee Arthroplasty POD #3    -Pain control prn  - DVT ppx  - WBAT/PT/OOB as tolerated   - FU am labs  - Med mgmt, continue home meds.   Orthopedics     Pt seen and examined at the bedside. Pain is well controlled at this time. Pt reports feeling dizziness after attempting bowel movement, walked to chair and laid down. States dizziness was not while standing or ambulating but when in bed.    LABS:                        9.9    7.02  )-----------( 141      ( 05 Jun 2022 05:44 )             29.0     06-05    140  |  105  |  4<L>  ----------------------------<  101<H>  4.4   |  31  |  0.52    Ca    8.1<L>      05 Jun 2022 05:44      PT/INR - ( 05 Jun 2022 05:44 )   PT: 12.6 sec;   INR: 1.06 ratio         PTT - ( 05 Jun 2022 05:44 )  PTT:37.4 sec      VITAL SIGNS:  T(C): 36.8 (06-05-22 @ 07:17), Max: 37.7 (06-04-22 @ 19:49)  HR: 93 (06-05-22 @ 07:17) (93 - 124)  BP: 117/81 (06-05-22 @ 07:17) (104/67 - 134/85)  RR: 17 (06-05-22 @ 07:17) (15 - 18)  SpO2: 100% (06-05-22 @ 07:17) (94% - 100%)    Exam:  GEN: NAD, awake and alert.  RLE/BLE  Dressing clean and dry  +EHL FHL TA GS  SILT L2-S1  +DP  Calf soft and nontender, compartments soft and compressible.      A/P:  36yF s/p B/l Total Knee Arthroplasty POD #3    -Pain control prn  - DVT ppx  - WBAT/PT/OOB as tolerated   - FU am labs  - Med mgmt, continue home meds.  -Obtaining EKG  -Will reach out to Dr. Avelar for further recs

## 2022-06-06 LAB
ANION GAP SERPL CALC-SCNC: 5 MMOL/L — SIGNIFICANT CHANGE UP (ref 5–17)
BUN SERPL-MCNC: 8 MG/DL — SIGNIFICANT CHANGE UP (ref 7–23)
CALCIUM SERPL-MCNC: 8.4 MG/DL — LOW (ref 8.5–10.1)
CHLORIDE SERPL-SCNC: 101 MMOL/L — SIGNIFICANT CHANGE UP (ref 96–108)
CO2 SERPL-SCNC: 31 MMOL/L — SIGNIFICANT CHANGE UP (ref 22–31)
CREAT SERPL-MCNC: 0.5 MG/DL — SIGNIFICANT CHANGE UP (ref 0.5–1.3)
EGFR: 123 ML/MIN/1.73M2 — SIGNIFICANT CHANGE UP
FLUAV AG NPH QL: SIGNIFICANT CHANGE UP
FLUBV AG NPH QL: SIGNIFICANT CHANGE UP
GLUCOSE SERPL-MCNC: 102 MG/DL — HIGH (ref 70–99)
HCT VFR BLD CALC: 32.3 % — LOW (ref 34.5–45)
HGB BLD-MCNC: 10.9 G/DL — LOW (ref 11.5–15.5)
MCHC RBC-ENTMCNC: 29.1 PG — SIGNIFICANT CHANGE UP (ref 27–34)
MCHC RBC-ENTMCNC: 33.7 G/DL — SIGNIFICANT CHANGE UP (ref 32–36)
MCV RBC AUTO: 86.4 FL — SIGNIFICANT CHANGE UP (ref 80–100)
NRBC # BLD: 0 /100 WBCS — SIGNIFICANT CHANGE UP (ref 0–0)
PLATELET # BLD AUTO: 158 K/UL — SIGNIFICANT CHANGE UP (ref 150–400)
POTASSIUM SERPL-MCNC: 4.5 MMOL/L — SIGNIFICANT CHANGE UP (ref 3.5–5.3)
POTASSIUM SERPL-SCNC: 4.5 MMOL/L — SIGNIFICANT CHANGE UP (ref 3.5–5.3)
RBC # BLD: 3.74 M/UL — LOW (ref 3.8–5.2)
RBC # FLD: 14.2 % — SIGNIFICANT CHANGE UP (ref 10.3–14.5)
SARS-COV-2 RNA SPEC QL NAA+PROBE: SIGNIFICANT CHANGE UP
SODIUM SERPL-SCNC: 137 MMOL/L — SIGNIFICANT CHANGE UP (ref 135–145)
WBC # BLD: 7.8 K/UL — SIGNIFICANT CHANGE UP (ref 3.8–10.5)
WBC # FLD AUTO: 7.8 K/UL — SIGNIFICANT CHANGE UP (ref 3.8–10.5)

## 2022-06-06 RX ADMIN — PANTOPRAZOLE SODIUM 40 MILLIGRAM(S): 20 TABLET, DELAYED RELEASE ORAL at 05:41

## 2022-06-06 RX ADMIN — CELECOXIB 200 MILLIGRAM(S): 200 CAPSULE ORAL at 19:05

## 2022-06-06 RX ADMIN — OXYCODONE HYDROCHLORIDE 10 MILLIGRAM(S): 5 TABLET ORAL at 20:34

## 2022-06-06 RX ADMIN — Medication 975 MILLIGRAM(S): at 06:06

## 2022-06-06 RX ADMIN — MAGNESIUM HYDROXIDE 30 MILLILITER(S): 400 TABLET, CHEWABLE ORAL at 22:05

## 2022-06-06 RX ADMIN — Medication 975 MILLIGRAM(S): at 05:41

## 2022-06-06 RX ADMIN — OXYCODONE HYDROCHLORIDE 10 MILLIGRAM(S): 5 TABLET ORAL at 21:34

## 2022-06-06 RX ADMIN — CELECOXIB 200 MILLIGRAM(S): 200 CAPSULE ORAL at 05:41

## 2022-06-06 RX ADMIN — OXYCODONE HYDROCHLORIDE 10 MILLIGRAM(S): 5 TABLET ORAL at 07:19

## 2022-06-06 RX ADMIN — OXYCODONE HYDROCHLORIDE 10 MILLIGRAM(S): 5 TABLET ORAL at 10:49

## 2022-06-06 RX ADMIN — Medication 325 MILLIGRAM(S): at 12:03

## 2022-06-06 RX ADMIN — Medication 975 MILLIGRAM(S): at 14:40

## 2022-06-06 RX ADMIN — OXYCODONE HYDROCHLORIDE 10 MILLIGRAM(S): 5 TABLET ORAL at 00:32

## 2022-06-06 RX ADMIN — ENOXAPARIN SODIUM 40 MILLIGRAM(S): 100 INJECTION SUBCUTANEOUS at 07:29

## 2022-06-06 RX ADMIN — Medication 975 MILLIGRAM(S): at 23:05

## 2022-06-06 RX ADMIN — Medication 975 MILLIGRAM(S): at 13:40

## 2022-06-06 RX ADMIN — OXYCODONE HYDROCHLORIDE 10 MILLIGRAM(S): 5 TABLET ORAL at 11:45

## 2022-06-06 RX ADMIN — OXYCODONE HYDROCHLORIDE 10 MILLIGRAM(S): 5 TABLET ORAL at 16:38

## 2022-06-06 RX ADMIN — CELECOXIB 200 MILLIGRAM(S): 200 CAPSULE ORAL at 06:06

## 2022-06-06 RX ADMIN — Medication 975 MILLIGRAM(S): at 22:05

## 2022-06-06 RX ADMIN — OXYCODONE HYDROCHLORIDE 10 MILLIGRAM(S): 5 TABLET ORAL at 15:38

## 2022-06-06 RX ADMIN — POLYETHYLENE GLYCOL 3350 17 GRAM(S): 17 POWDER, FOR SOLUTION ORAL at 22:05

## 2022-06-06 RX ADMIN — OXYCODONE HYDROCHLORIDE 10 MILLIGRAM(S): 5 TABLET ORAL at 07:29

## 2022-06-06 RX ADMIN — Medication 1 MILLIGRAM(S): at 12:03

## 2022-06-06 RX ADMIN — Medication 1 TABLET(S): at 12:03

## 2022-06-06 RX ADMIN — CELECOXIB 200 MILLIGRAM(S): 200 CAPSULE ORAL at 18:07

## 2022-06-06 RX ADMIN — OXYCODONE HYDROCHLORIDE 10 MILLIGRAM(S): 5 TABLET ORAL at 01:30

## 2022-06-06 RX ADMIN — OXYCODONE HYDROCHLORIDE 10 MILLIGRAM(S): 5 TABLET ORAL at 04:15

## 2022-06-06 NOTE — PROGRESS NOTE ADULT - SUBJECTIVE AND OBJECTIVE BOX
Health Maintenance Summary     Topic Due On Due Status Completed On    Pap Smear - Cervical Cancer Screening  Sep 2, 2017 Overdue     Immunization - TDAP Pregnancy  Hidden     IMMUNIZATION - DTaP/Tdap/Td Jul 25, 2022 Not Due Jul 25, 2012    Immunization-Influenza Sep 1, 2017 Overdue           Patient is due for topics as listed above, she wishes to discuss with provider .             Patient seen and examined at bedside. Pain well controlled with medication. Patient denies any numbness, tingling, weakness, or any other orthopaedic complaint. Denies N/V/CP/SOB.         VITAL SIGNS:  T(C): 36.4 (06-06-22 @ 05:36), Max: 37.3 (06-06-22 @ 00:06)  HR: 91 (06-06-22 @ 05:36) (91 - 140)  BP: 126/88 (06-06-22 @ 05:36) (117/81 - 138/90)  RR: 17 (06-06-22 @ 05:36) (16 - 18)  SpO2: 99% (06-06-22 @ 05:36) (97% - 100%)      LABS:                        9.9    7.02  )-----------( 141      ( 05 Jun 2022 05:44 )             29.0     06-05    140  |  105  |  4<L>  ----------------------------<  101<H>  4.4   |  31  |  0.52    Ca    8.1<L>      05 Jun 2022 05:44      PT/INR - ( 05 Jun 2022 05:44 )   PT: 12.6 sec;   INR: 1.06 ratio         PTT - ( 05 Jun 2022 05:44 )  PTT:37.4 sec        Exam:  GEN: NAD, awake and alert.  RLE/BLE  Dressing clean and dry  +EHL FHL TA GS  SILT L2-S1  +DP  Calf soft and nontender, compartments soft and compressible.      A/P:  36yF s/p B/l Total Knee Arthroplasty POD #4    -patient had continued tachycardia overnight, EKG showed sinus tachy, stable now, will monitor  -Pain control prn  - DVT ppx - lovenox  - WBAT/PT/OOB as tolerated   - FU am labs  - Med mgmt, continue home meds.  - AAppreciate medical recs for tachy  -Orthostable for DC

## 2022-06-07 LAB
ANION GAP SERPL CALC-SCNC: 6 MMOL/L — SIGNIFICANT CHANGE UP (ref 5–17)
BUN SERPL-MCNC: 10 MG/DL — SIGNIFICANT CHANGE UP (ref 7–23)
CALCIUM SERPL-MCNC: 8.5 MG/DL — SIGNIFICANT CHANGE UP (ref 8.5–10.1)
CHLORIDE SERPL-SCNC: 101 MMOL/L — SIGNIFICANT CHANGE UP (ref 96–108)
CO2 SERPL-SCNC: 33 MMOL/L — HIGH (ref 22–31)
CREAT SERPL-MCNC: 0.53 MG/DL — SIGNIFICANT CHANGE UP (ref 0.5–1.3)
EGFR: 121 ML/MIN/1.73M2 — SIGNIFICANT CHANGE UP
GLUCOSE SERPL-MCNC: 104 MG/DL — HIGH (ref 70–99)
HCT VFR BLD CALC: 32.4 % — LOW (ref 34.5–45)
HGB BLD-MCNC: 10.9 G/DL — LOW (ref 11.5–15.5)
MCHC RBC-ENTMCNC: 29.5 PG — SIGNIFICANT CHANGE UP (ref 27–34)
MCHC RBC-ENTMCNC: 33.6 G/DL — SIGNIFICANT CHANGE UP (ref 32–36)
MCV RBC AUTO: 87.6 FL — SIGNIFICANT CHANGE UP (ref 80–100)
NRBC # BLD: 0 /100 WBCS — SIGNIFICANT CHANGE UP (ref 0–0)
PLATELET # BLD AUTO: 211 K/UL — SIGNIFICANT CHANGE UP (ref 150–400)
POTASSIUM SERPL-MCNC: 4.4 MMOL/L — SIGNIFICANT CHANGE UP (ref 3.5–5.3)
POTASSIUM SERPL-SCNC: 4.4 MMOL/L — SIGNIFICANT CHANGE UP (ref 3.5–5.3)
RBC # BLD: 3.7 M/UL — LOW (ref 3.8–5.2)
RBC # FLD: 14 % — SIGNIFICANT CHANGE UP (ref 10.3–14.5)
SODIUM SERPL-SCNC: 140 MMOL/L — SIGNIFICANT CHANGE UP (ref 135–145)
WBC # BLD: 6.74 K/UL — SIGNIFICANT CHANGE UP (ref 3.8–10.5)
WBC # FLD AUTO: 6.74 K/UL — SIGNIFICANT CHANGE UP (ref 3.8–10.5)

## 2022-06-07 RX ORDER — METOPROLOL TARTRATE 50 MG
12.5 TABLET ORAL
Refills: 0 | Status: DISCONTINUED | OUTPATIENT
Start: 2022-06-07 | End: 2022-06-09

## 2022-06-07 RX ORDER — METOPROLOL TARTRATE 50 MG
1 TABLET ORAL
Qty: 0 | Refills: 0 | DISCHARGE
Start: 2022-06-07

## 2022-06-07 RX ADMIN — Medication 12.5 MILLIGRAM(S): at 21:39

## 2022-06-07 RX ADMIN — OXYCODONE HYDROCHLORIDE 10 MILLIGRAM(S): 5 TABLET ORAL at 13:30

## 2022-06-07 RX ADMIN — OXYCODONE HYDROCHLORIDE 10 MILLIGRAM(S): 5 TABLET ORAL at 04:12

## 2022-06-07 RX ADMIN — CELECOXIB 200 MILLIGRAM(S): 200 CAPSULE ORAL at 18:32

## 2022-06-07 RX ADMIN — CELECOXIB 200 MILLIGRAM(S): 200 CAPSULE ORAL at 06:30

## 2022-06-07 RX ADMIN — Medication 975 MILLIGRAM(S): at 22:36

## 2022-06-07 RX ADMIN — OXYCODONE HYDROCHLORIDE 10 MILLIGRAM(S): 5 TABLET ORAL at 12:33

## 2022-06-07 RX ADMIN — OXYCODONE HYDROCHLORIDE 10 MILLIGRAM(S): 5 TABLET ORAL at 18:15

## 2022-06-07 RX ADMIN — ONDANSETRON 4 MILLIGRAM(S): 8 TABLET, FILM COATED ORAL at 10:28

## 2022-06-07 RX ADMIN — OXYCODONE HYDROCHLORIDE 10 MILLIGRAM(S): 5 TABLET ORAL at 00:02

## 2022-06-07 RX ADMIN — CELECOXIB 200 MILLIGRAM(S): 200 CAPSULE ORAL at 19:20

## 2022-06-07 RX ADMIN — Medication 975 MILLIGRAM(S): at 07:23

## 2022-06-07 RX ADMIN — OXYCODONE HYDROCHLORIDE 10 MILLIGRAM(S): 5 TABLET ORAL at 05:12

## 2022-06-07 RX ADMIN — ENOXAPARIN SODIUM 40 MILLIGRAM(S): 100 INJECTION SUBCUTANEOUS at 09:22

## 2022-06-07 RX ADMIN — OXYCODONE HYDROCHLORIDE 5 MILLIGRAM(S): 5 TABLET ORAL at 02:41

## 2022-06-07 RX ADMIN — OXYCODONE HYDROCHLORIDE 10 MILLIGRAM(S): 5 TABLET ORAL at 17:14

## 2022-06-07 RX ADMIN — SENNA PLUS 2 TABLET(S): 8.6 TABLET ORAL at 21:36

## 2022-06-07 RX ADMIN — Medication 1 TABLET(S): at 12:37

## 2022-06-07 RX ADMIN — PANTOPRAZOLE SODIUM 40 MILLIGRAM(S): 20 TABLET, DELAYED RELEASE ORAL at 05:38

## 2022-06-07 RX ADMIN — Medication 12.5 MILLIGRAM(S): at 15:29

## 2022-06-07 RX ADMIN — CELECOXIB 200 MILLIGRAM(S): 200 CAPSULE ORAL at 05:38

## 2022-06-07 RX ADMIN — OXYCODONE HYDROCHLORIDE 10 MILLIGRAM(S): 5 TABLET ORAL at 22:36

## 2022-06-07 RX ADMIN — OXYCODONE HYDROCHLORIDE 10 MILLIGRAM(S): 5 TABLET ORAL at 21:37

## 2022-06-07 RX ADMIN — MAGNESIUM HYDROXIDE 30 MILLILITER(S): 400 TABLET, CHEWABLE ORAL at 17:14

## 2022-06-07 RX ADMIN — Medication 325 MILLIGRAM(S): at 12:37

## 2022-06-07 RX ADMIN — Medication 975 MILLIGRAM(S): at 21:36

## 2022-06-07 RX ADMIN — OXYCODONE HYDROCHLORIDE 10 MILLIGRAM(S): 5 TABLET ORAL at 01:02

## 2022-06-07 RX ADMIN — OXYCODONE HYDROCHLORIDE 5 MILLIGRAM(S): 5 TABLET ORAL at 03:41

## 2022-06-07 RX ADMIN — POLYETHYLENE GLYCOL 3350 17 GRAM(S): 17 POWDER, FOR SOLUTION ORAL at 21:36

## 2022-06-07 RX ADMIN — Medication 975 MILLIGRAM(S): at 07:00

## 2022-06-07 RX ADMIN — Medication 1 MILLIGRAM(S): at 12:38

## 2022-06-07 NOTE — PROGRESS NOTE ADULT - SUBJECTIVE AND OBJECTIVE BOX
Patient seen and examined at bedside. Pain well-controlled with medication. Patient denies any numbness, tingling, weakness, or any other orthopaedic complaint. Denies N/V/CP/SOB. Stable asymptomatic tachycardia overnight, per Medicine likely related to intra-operative blood loss and physiologic pain response.        VITAL SIGNS:  T(C): 36.8 (07 Jun 2022 05:00), Max: 36.8 (06 Jun 2022 12:42)  T(F): 98.3 (07 Jun 2022 05:00), Max: 98.3 (06 Jun 2022 12:42)  HR: 106 (07 Jun 2022 05:00) (103 - 125)  BP: 123/86 (07 Jun 2022 05:00) (122/85 - 146/87)  RR: 18 (07 Jun 2022 05:00) (16 - 18)  SpO2: 96% (07 Jun 2022 05:00) (96% - 100%)        LABS:                        10.9   7.80  )-----------( 158      ( 06 Jun 2022 06:30 )             32.3     06-06    137  |  101  |  8   ----------------------------<  102<H>  4.5   |  31  |  0.50    Ca    8.4<L>      06 Jun 2022 06:30        Exam:  GEN: NAD, awake and alert.  RLE/LLE (BL LE)  Dressings C/D/I.   +EHL/FHL/TA/GS.   SILT L2-S1.   +DP.   Calf soft and nontender, compartments soft and compressible.      A/P:  36yF s/p B/l Total Knee Arthroplasty POD #5    - Patient with stable tachycardia, Sinus Tachycardia on EKG; Per Medicine, likely response to operative blood loss and physiologic pain response.   - Pain control PRN.   - DVT ppx - Lovenox.   - WBAT/PT/OOB as tolerated.    - FU AM labs.   - Med mgmt, continue home meds.  - Orthopaedically stable for DC.   - Will discuss with Dr. Harris and advise of any changes to the above plan.

## 2022-06-07 NOTE — PROGRESS NOTE ADULT - SUBJECTIVE AND OBJECTIVE BOX
Chief Complaint:  Patient is a 36y old  Female who presents with a chief complaint of BL TKA (03 Jun 2022 06:24)      HPI: Currently no sob or chest pain or palpitation . No current cough or fever . Declining resting tachycardia . No Hypoxia . + Pain . Stable Hgb at 10.9 after transfusion .      Review of Systems:    General:  No wt loss, fevers, chills, night sweats  Eyes:  Good vision, no reported pain  ENT:  No sore throat, pain, runny nose, dysphagia  CV:  No pain, palpitations, hypo/hypertension  Resp:  No dyspnea, cough, tachypnea, wheezing  GI:  No pain, nausea, vomiting, diarrhea, constipation  :  No pain, bleeding, incontinence, nocturia  Muscle:  No pain, weakness  Breast:  No pain, abscess, mass, discharge  Neuro:  No weakness, tingling, memory problems  Psych:  No fatigue, insomnia, mood problems, depression  Endocrine:  No polyuria, polydypsia, cold/heat intolerance  Heme:  No petechiae, ecchymosis, easy bruisability  Skin:  No rash, tattoos, scars, edema      Physical Exam:      Vital Signs:  Vital Signs Last 24 Hrs  T(C): 36.8 (07 Jun 2022 05:00), Max: 36.8 (06 Jun 2022 12:42)  T(F): 98.3 (07 Jun 2022 05:00), Max: 98.3 (06 Jun 2022 12:42)  HR: 106 (07 Jun 2022 05:00) (103 - 125)  BP: 123/86 (07 Jun 2022 05:00) (122/85 - 146/87)  BP(mean): --  RR: 18 (07 Jun 2022 05:00) (18 - 18)  SpO2: 96% (07 Jun 2022 05:00) (96% - 100%)  Daily     Daily   I&O's Summary      General:  Appears stated age, well-groomed, well-nourished, no distress  HEENT:  NC/AT, patent nares w/ pink mucosa, OP clear w/o lesions, PERRL, EOMI, conjunctivae clear, no thyromegaly, nodules, adenopathy, no JVD  Chest:  Full & symmetric excursion, no increased effort, breath sounds clear  Cardiovascular:  Regular rhythm, S1, S2, no murmur/rub/S3/S4, no carotid/femoral/abdominal bruit, radial/pedal pulses 2+, no edema  Abdomen:  Soft, non-tender, non-distended, normoactive bowel sounds, no HSM  Extremities: B/L knee fulness . + pulse .  Skin:  No rash/erythema/ecchymoses/petechiae/wounds/abscess/warm/dry  Musculoskeletal: Intact .  Neuro/Psych:  Alert, oriented, normal and symmetric strength in UEs, LEs .    Laboratory:                            10.9   6.74  )-----------( 211      ( 07 Jun 2022 06:39 )             32.4     06-07    140  |  101  |  10  ----------------------------<  104<H>  4.4   |  33<H>  |  0.53    Ca    8.5      07 Jun 2022 06:39            CAPILLARY BLOOD GLUCOSE                Imaging:      Assessment: S/P B/L Knee replacement . Tachycardia Post-Op with blood lose .      Plan: PT/OT . Pain control . DVT prophylaxis . Incentive spirometry . tachycardia likely physiological  response to blood lose and pain . Got transfusion with stable Hgb of 10.9 . Iron . Pain control . No hypoxia . Monitor the CBC . Can be discharged to rehab .

## 2022-06-08 LAB
ANION GAP SERPL CALC-SCNC: 4 MMOL/L — LOW (ref 5–17)
BUN SERPL-MCNC: 11 MG/DL — SIGNIFICANT CHANGE UP (ref 7–23)
CALCIUM SERPL-MCNC: 8.6 MG/DL — SIGNIFICANT CHANGE UP (ref 8.5–10.1)
CHLORIDE SERPL-SCNC: 103 MMOL/L — SIGNIFICANT CHANGE UP (ref 96–108)
CO2 SERPL-SCNC: 33 MMOL/L — HIGH (ref 22–31)
CREAT SERPL-MCNC: 0.55 MG/DL — SIGNIFICANT CHANGE UP (ref 0.5–1.3)
EGFR: 120 ML/MIN/1.73M2 — SIGNIFICANT CHANGE UP
GLUCOSE SERPL-MCNC: 100 MG/DL — HIGH (ref 70–99)
HCT VFR BLD CALC: 31.3 % — LOW (ref 34.5–45)
HGB BLD-MCNC: 10.5 G/DL — LOW (ref 11.5–15.5)
MCHC RBC-ENTMCNC: 30.2 PG — SIGNIFICANT CHANGE UP (ref 27–34)
MCHC RBC-ENTMCNC: 33.5 G/DL — SIGNIFICANT CHANGE UP (ref 32–36)
MCV RBC AUTO: 89.9 FL — SIGNIFICANT CHANGE UP (ref 80–100)
NRBC # BLD: 0 /100 WBCS — SIGNIFICANT CHANGE UP (ref 0–0)
PLATELET # BLD AUTO: 209 K/UL — SIGNIFICANT CHANGE UP (ref 150–400)
POTASSIUM SERPL-MCNC: 4.5 MMOL/L — SIGNIFICANT CHANGE UP (ref 3.5–5.3)
POTASSIUM SERPL-SCNC: 4.5 MMOL/L — SIGNIFICANT CHANGE UP (ref 3.5–5.3)
RBC # BLD: 3.48 M/UL — LOW (ref 3.8–5.2)
RBC # FLD: 14 % — SIGNIFICANT CHANGE UP (ref 10.3–14.5)
SODIUM SERPL-SCNC: 140 MMOL/L — SIGNIFICANT CHANGE UP (ref 135–145)
WBC # BLD: 5.62 K/UL — SIGNIFICANT CHANGE UP (ref 3.8–10.5)
WBC # FLD AUTO: 5.62 K/UL — SIGNIFICANT CHANGE UP (ref 3.8–10.5)

## 2022-06-08 RX ADMIN — ENOXAPARIN SODIUM 40 MILLIGRAM(S): 100 INJECTION SUBCUTANEOUS at 08:56

## 2022-06-08 RX ADMIN — Medication 1 TABLET(S): at 11:35

## 2022-06-08 RX ADMIN — CELECOXIB 200 MILLIGRAM(S): 200 CAPSULE ORAL at 17:09

## 2022-06-08 RX ADMIN — OXYCODONE HYDROCHLORIDE 10 MILLIGRAM(S): 5 TABLET ORAL at 04:03

## 2022-06-08 RX ADMIN — Medication 12.5 MILLIGRAM(S): at 05:54

## 2022-06-08 RX ADMIN — Medication 975 MILLIGRAM(S): at 21:33

## 2022-06-08 RX ADMIN — Medication 975 MILLIGRAM(S): at 06:50

## 2022-06-08 RX ADMIN — TRAMADOL HYDROCHLORIDE 50 MILLIGRAM(S): 50 TABLET ORAL at 21:33

## 2022-06-08 RX ADMIN — OXYCODONE HYDROCHLORIDE 10 MILLIGRAM(S): 5 TABLET ORAL at 03:03

## 2022-06-08 RX ADMIN — TRAMADOL HYDROCHLORIDE 50 MILLIGRAM(S): 50 TABLET ORAL at 22:30

## 2022-06-08 RX ADMIN — Medication 975 MILLIGRAM(S): at 05:54

## 2022-06-08 RX ADMIN — Medication 1 MILLIGRAM(S): at 11:35

## 2022-06-08 RX ADMIN — OXYCODONE HYDROCHLORIDE 10 MILLIGRAM(S): 5 TABLET ORAL at 11:35

## 2022-06-08 RX ADMIN — OXYCODONE HYDROCHLORIDE 10 MILLIGRAM(S): 5 TABLET ORAL at 12:30

## 2022-06-08 RX ADMIN — PANTOPRAZOLE SODIUM 40 MILLIGRAM(S): 20 TABLET, DELAYED RELEASE ORAL at 05:54

## 2022-06-08 RX ADMIN — Medication 975 MILLIGRAM(S): at 22:30

## 2022-06-08 RX ADMIN — Medication 975 MILLIGRAM(S): at 17:09

## 2022-06-08 RX ADMIN — OXYCODONE HYDROCHLORIDE 10 MILLIGRAM(S): 5 TABLET ORAL at 20:06

## 2022-06-08 RX ADMIN — Medication 325 MILLIGRAM(S): at 11:34

## 2022-06-08 RX ADMIN — CELECOXIB 200 MILLIGRAM(S): 200 CAPSULE ORAL at 18:09

## 2022-06-08 RX ADMIN — CELECOXIB 200 MILLIGRAM(S): 200 CAPSULE ORAL at 05:54

## 2022-06-08 RX ADMIN — OXYCODONE HYDROCHLORIDE 10 MILLIGRAM(S): 5 TABLET ORAL at 23:33

## 2022-06-08 RX ADMIN — Medication 975 MILLIGRAM(S): at 15:53

## 2022-06-08 RX ADMIN — Medication 12.5 MILLIGRAM(S): at 17:09

## 2022-06-08 RX ADMIN — OXYCODONE HYDROCHLORIDE 10 MILLIGRAM(S): 5 TABLET ORAL at 19:06

## 2022-06-08 RX ADMIN — CELECOXIB 200 MILLIGRAM(S): 200 CAPSULE ORAL at 06:51

## 2022-06-08 NOTE — PROGRESS NOTE ADULT - SUBJECTIVE AND OBJECTIVE BOX
Patient seen and examined at bedside. Pain well-controlled with medication. Patient denies any numbness, tingling, weakness, or any other orthopaedic complaint. Denies N/V/CP/SOB. Stable asymptomatic tachycardia overnight. Dr. Avelar, Medicine reviewed case yesterday and reports tachy is likely related to intra-operative blood loss and physiologic pain response.        VITAL SIGNS:  T(C): 36.6 (06-08-22 @ 05:39), Max: 37 (06-07-22 @ 17:19)  HR: 88 (06-08-22 @ 05:39) (85 - 107)  BP: 115/80 (06-08-22 @ 05:39) (115/80 - 131/85)  RR: 18 (06-08-22 @ 05:39) (17 - 18)  SpO2: 97% (06-08-22 @ 05:39) (96% - 100%)      LABS:                        10.9   6.74  )-----------( 211      ( 07 Jun 2022 06:39 )             32.4     06-07    140  |  101  |  10  ----------------------------<  104<H>  4.4   |  33<H>  |  0.53    Ca    8.5      07 Jun 2022 06:39          Exam:  GEN: NAD, awake and alert.  RLE/LLE (BL LE)  Dressings C/D/I.   +EHL/FHL/TA/GS.   SILT L2-S1.   +DP.   Calf soft and nontender, compartments soft and compressible.      A/P:  36yF s/p B/l Total Knee Arthroplasty POD #6    - Patient with stable tachycardia, Sinus Tachycardia on EKG; Per Medicine, likely response to operative blood loss and physiologic pain response.   - Pain control PRN.   - DVT ppx - Lovenox.   - WBAT/PT/OOB as tolerated.    - FU AM labs.   - Med mgmt, continue home meds.  - Orthopaedically stable for DC.   - Will discuss with Dr. Harris and advise of any changes to the above plan.

## 2022-06-09 ENCOUNTER — TRANSCRIPTION ENCOUNTER (OUTPATIENT)
Age: 37
End: 2022-06-09

## 2022-06-09 VITALS
SYSTOLIC BLOOD PRESSURE: 126 MMHG | OXYGEN SATURATION: 98 % | HEART RATE: 99 BPM | RESPIRATION RATE: 16 BRPM | TEMPERATURE: 98 F | DIASTOLIC BLOOD PRESSURE: 78 MMHG

## 2022-06-09 LAB
ANION GAP SERPL CALC-SCNC: 5 MMOL/L — SIGNIFICANT CHANGE UP (ref 5–17)
BUN SERPL-MCNC: 10 MG/DL — SIGNIFICANT CHANGE UP (ref 7–23)
CALCIUM SERPL-MCNC: 8.6 MG/DL — SIGNIFICANT CHANGE UP (ref 8.5–10.1)
CHLORIDE SERPL-SCNC: 102 MMOL/L — SIGNIFICANT CHANGE UP (ref 96–108)
CO2 SERPL-SCNC: 31 MMOL/L — SIGNIFICANT CHANGE UP (ref 22–31)
CREAT SERPL-MCNC: 0.49 MG/DL — LOW (ref 0.5–1.3)
EGFR: 124 ML/MIN/1.73M2 — SIGNIFICANT CHANGE UP
FLUAV AG NPH QL: SIGNIFICANT CHANGE UP
FLUBV AG NPH QL: SIGNIFICANT CHANGE UP
GLUCOSE SERPL-MCNC: 101 MG/DL — HIGH (ref 70–99)
HCT VFR BLD CALC: 32 % — LOW (ref 34.5–45)
HGB BLD-MCNC: 10.5 G/DL — LOW (ref 11.5–15.5)
MCHC RBC-ENTMCNC: 29.4 PG — SIGNIFICANT CHANGE UP (ref 27–34)
MCHC RBC-ENTMCNC: 32.8 G/DL — SIGNIFICANT CHANGE UP (ref 32–36)
MCV RBC AUTO: 89.6 FL — SIGNIFICANT CHANGE UP (ref 80–100)
NRBC # BLD: 0 /100 WBCS — SIGNIFICANT CHANGE UP (ref 0–0)
PLATELET # BLD AUTO: 233 K/UL — SIGNIFICANT CHANGE UP (ref 150–400)
POTASSIUM SERPL-MCNC: 4.6 MMOL/L — SIGNIFICANT CHANGE UP (ref 3.5–5.3)
POTASSIUM SERPL-SCNC: 4.6 MMOL/L — SIGNIFICANT CHANGE UP (ref 3.5–5.3)
RBC # BLD: 3.57 M/UL — LOW (ref 3.8–5.2)
RBC # FLD: 13.9 % — SIGNIFICANT CHANGE UP (ref 10.3–14.5)
SARS-COV-2 RNA SPEC QL NAA+PROBE: SIGNIFICANT CHANGE UP
SODIUM SERPL-SCNC: 138 MMOL/L — SIGNIFICANT CHANGE UP (ref 135–145)
WBC # BLD: 6.71 K/UL — SIGNIFICANT CHANGE UP (ref 3.8–10.5)
WBC # FLD AUTO: 6.71 K/UL — SIGNIFICANT CHANGE UP (ref 3.8–10.5)

## 2022-06-09 RX ADMIN — OXYCODONE HYDROCHLORIDE 10 MILLIGRAM(S): 5 TABLET ORAL at 09:20

## 2022-06-09 RX ADMIN — Medication 12.5 MILLIGRAM(S): at 17:35

## 2022-06-09 RX ADMIN — CELECOXIB 200 MILLIGRAM(S): 200 CAPSULE ORAL at 17:36

## 2022-06-09 RX ADMIN — CELECOXIB 200 MILLIGRAM(S): 200 CAPSULE ORAL at 06:04

## 2022-06-09 RX ADMIN — Medication 975 MILLIGRAM(S): at 14:48

## 2022-06-09 RX ADMIN — Medication 1 TABLET(S): at 11:15

## 2022-06-09 RX ADMIN — Medication 975 MILLIGRAM(S): at 06:59

## 2022-06-09 RX ADMIN — PANTOPRAZOLE SODIUM 40 MILLIGRAM(S): 20 TABLET, DELAYED RELEASE ORAL at 06:05

## 2022-06-09 RX ADMIN — OXYCODONE HYDROCHLORIDE 10 MILLIGRAM(S): 5 TABLET ORAL at 14:28

## 2022-06-09 RX ADMIN — Medication 12.5 MILLIGRAM(S): at 06:05

## 2022-06-09 RX ADMIN — OXYCODONE HYDROCHLORIDE 10 MILLIGRAM(S): 5 TABLET ORAL at 18:00

## 2022-06-09 RX ADMIN — CELECOXIB 200 MILLIGRAM(S): 200 CAPSULE ORAL at 06:59

## 2022-06-09 RX ADMIN — OXYCODONE HYDROCHLORIDE 10 MILLIGRAM(S): 5 TABLET ORAL at 04:55

## 2022-06-09 RX ADMIN — Medication 1 MILLIGRAM(S): at 11:15

## 2022-06-09 RX ADMIN — OXYCODONE HYDROCHLORIDE 10 MILLIGRAM(S): 5 TABLET ORAL at 15:30

## 2022-06-09 RX ADMIN — CELECOXIB 200 MILLIGRAM(S): 200 CAPSULE ORAL at 18:09

## 2022-06-09 RX ADMIN — Medication 975 MILLIGRAM(S): at 06:04

## 2022-06-09 RX ADMIN — OXYCODONE HYDROCHLORIDE 10 MILLIGRAM(S): 5 TABLET ORAL at 00:30

## 2022-06-09 RX ADMIN — OXYCODONE HYDROCHLORIDE 10 MILLIGRAM(S): 5 TABLET ORAL at 03:55

## 2022-06-09 RX ADMIN — Medication 325 MILLIGRAM(S): at 11:16

## 2022-06-09 RX ADMIN — ENOXAPARIN SODIUM 40 MILLIGRAM(S): 100 INJECTION SUBCUTANEOUS at 10:19

## 2022-06-09 RX ADMIN — OXYCODONE HYDROCHLORIDE 10 MILLIGRAM(S): 5 TABLET ORAL at 08:17

## 2022-06-09 RX ADMIN — OXYCODONE HYDROCHLORIDE 10 MILLIGRAM(S): 5 TABLET ORAL at 17:27

## 2022-06-09 RX ADMIN — Medication 975 MILLIGRAM(S): at 15:30

## 2022-06-09 NOTE — DIETITIAN INITIAL EVALUATION ADULT - NS FNS DIET ORDER
*** Patient arrived for a CCTA. ***. They were connected to a cardiac monitor and vital signs were obtained. The patient's heart rate was *** . Medications and allergies were reviewed. ***. The procedure was explained, and the patient was instructed to rest and relax, as much as possible. A saline lock was established. The patient's heart rate ***. The patient was offered the restroom, and then they walked to CT. The cardiac monitor was placed there, and ***. The patient was given one bottle of water, and they were told to  Drink at least 3 other additional glasses of water today, per post contrast instructions. The patient left ambulatory in stable condition.   
Regular

## 2022-06-09 NOTE — DIETITIAN INITIAL EVALUATION ADULT - OTHER INFO
Pt reports good appetite and PO intake PTA. States no diet restrictions at home. Confirms food allergies to shrimp and blueberries.  Reports good appetite and PO intake during LOS. Denies difficulty chewing/swallowing. Pt denies nausea, vomiting, diarrhea, or constipation. States no weight changes;  pounds.  Pt with no nutrition related questions/preferences at this time. Made aware RD remains available.

## 2022-06-09 NOTE — DIETITIAN INITIAL EVALUATION ADULT - PERTINENT MEDS FT
MEDICATIONS  (STANDING):  acetaminophen     Tablet .. 975 milliGRAM(s) Oral every 8 hours  budesonide 160 MICROgram(s)/formoterol 4.5 MICROgram(s) Inhaler 2 Puff(s) Inhalation two times a day  celecoxib 200 milliGRAM(s) Oral every 12 hours  enoxaparin Injectable 40 milliGRAM(s) SubCutaneous every 24 hours  ferrous    sulfate 325 milliGRAM(s) Oral daily  folic acid 1 milliGRAM(s) Oral daily  influenza   Vaccine 0.5 milliLiter(s) IntraMuscular once  metoprolol tartrate 12.5 milliGRAM(s) Oral two times a day  multivitamin 1 Tablet(s) Oral daily  pantoprazole    Tablet 40 milliGRAM(s) Oral before breakfast  polyethylene glycol 3350 17 Gram(s) Oral at bedtime  senna 2 Tablet(s) Oral at bedtime    MEDICATIONS  (PRN):  ALBUTerol    90 MICROgram(s) HFA Inhaler 2 Puff(s) Inhalation every 6 hours PRN Shortness of Breath and/or Wheezing  aluminum hydroxide/magnesium hydroxide/simethicone Suspension 30 milliLiter(s) Oral four times a day PRN Indigestion  benzocaine 15 mG/menthol 3.6 mG Lozenge 1 Lozenge Oral every 3 hours PRN Sore Throat  bisacodyl Suppository 10 milliGRAM(s) Rectal daily PRN Constipation  magnesium hydroxide Suspension 30 milliLiter(s) Oral daily PRN Constipation  ondansetron Injectable 4 milliGRAM(s) IV Push every 6 hours PRN Nausea and/or Vomiting  oxyCODONE    IR 5 milliGRAM(s) Oral every 3 hours PRN Moderate Pain (4 - 6)  oxyCODONE    IR 10 milliGRAM(s) Oral every 3 hours PRN Severe Pain (7 - 10)  traMADol 50 milliGRAM(s) Oral every 6 hours PRN Mild Pain (1 - 3)

## 2022-06-09 NOTE — DIETITIAN INITIAL EVALUATION ADULT - OTHER CALCULATIONS
Ht (cm): 165.1cm  Wt (kg): 65.8kg  BMI: 24.1    IBW: 56.6kg +/- 10%  %IBW: 116% UBW: 65.8kg %UBW: 100%

## 2022-06-09 NOTE — PROGRESS NOTE ADULT - SUBJECTIVE AND OBJECTIVE BOX
Patient seen and examined at bedside. Pain well-controlled with medication. Patient denies any numbness, tingling, weakness, or any other orthopaedic complaint. Denies N/V/CP/SOB.     VITAL SIGNS:  T(C): 36.9 (09 Jun 2022 05:35), Max: 37.1 (08 Jun 2022 16:13)  T(F): 98.4 (09 Jun 2022 05:35), Max: 98.7 (08 Jun 2022 16:13)  HR: 95 (09 Jun 2022 05:35) (93 - 118)  BP: 127/86 (09 Jun 2022 05:35) (114/79 - 127/86)  RR: 18 (09 Jun 2022 05:35) (18 - 18)  SpO2: 98% (09 Jun 2022 05:35) (97% - 100%)      LABS:               10.5   5.62  )-----------( 209      ( 08 Jun 2022 06:46 )             31.3   06-08    140  |  103  |  11  ----------------------------<  100<H>  4.5   |  33<H>  |  0.55    Ca    8.6      08 Jun 2022 06:46      Exam:  GEN: NAD, awake and alert.  RLE/LLE (BL LE)  Dressings C/D/I.   +EHL/FHL/TA/GS.   SILT L2-S1.   +DP.   Calf soft and nontender, compartments soft and compressible.  SCDs in place.       A/P:  36yF s/p B/l Total Knee Arthroplasty POD #7    - Pain control PRN.   - DVT ppx - Lovenox.   - WBAT/PT/OOB as tolerated.    - FU AM labs.   - Med mgmt, continue home meds.  - INO per PT recommendations.   - Orthopaedically stable for DC to [facility].    - Will discuss with Dr. Harris and advise of any changes to the above plan.

## 2022-06-09 NOTE — DIETITIAN INITIAL EVALUATION ADULT - PERTINENT LABORATORY DATA
06-09    138  |  102  |  10  ----------------------------<  101<H>  4.6   |  31  |  0.49<L>    Ca    8.6      09 Jun 2022 06:44    A1C with Estimated Average Glucose Result: 5.2 % (05-18-22 @ 10:34)

## 2022-06-09 NOTE — PROGRESS NOTE ADULT - PROVIDER SPECIALTY LIST ADULT
Internal Medicine
Orthopedics
Internal Medicine
Orthopedics

## 2022-06-09 NOTE — DISCHARGE NOTE NURSING/CASE MANAGEMENT/SOCIAL WORK - PATIENT PORTAL LINK FT
You can access the FollowMyHealth Patient Portal offered by [facility] by registering at the following website: [email or web address] By joining [facility]’s FollowMyHealth portal, you will also be able to view your health information using other applications (apps) compatible with our system.

## 2022-06-09 NOTE — DISCHARGE NOTE NURSING/CASE MANAGEMENT/SOCIAL WORK - NSDCPEFALRISK_GEN_ALL_CORE
For information on Fall & Injury Prevention, visit: [email or web address] OR  [email or web address] OR  https://www.cdc.gov/steadi/patient.html

## 2022-06-15 DIAGNOSIS — F41.9 ANXIETY DISORDER, UNSPECIFIED: ICD-10-CM

## 2022-06-15 DIAGNOSIS — Z86.16 PERSONAL HISTORY OF COVID-19: ICD-10-CM

## 2022-06-15 DIAGNOSIS — J45.909 UNSPECIFIED ASTHMA, UNCOMPLICATED: ICD-10-CM

## 2022-06-15 DIAGNOSIS — G43.909 MIGRAINE, UNSPECIFIED, NOT INTRACTABLE, WITHOUT STATUS MIGRAINOSUS: ICD-10-CM

## 2022-06-15 DIAGNOSIS — R00.0 TACHYCARDIA, UNSPECIFIED: ICD-10-CM

## 2022-06-15 DIAGNOSIS — Z79.51 LONG TERM (CURRENT) USE OF INHALED STEROIDS: ICD-10-CM

## 2022-06-15 DIAGNOSIS — D64.9 ANEMIA, UNSPECIFIED: ICD-10-CM

## 2022-06-15 DIAGNOSIS — M17.0 BILATERAL PRIMARY OSTEOARTHRITIS OF KNEE: ICD-10-CM

## 2022-06-15 DIAGNOSIS — Z87.891 PERSONAL HISTORY OF NICOTINE DEPENDENCE: ICD-10-CM

## 2022-06-15 DIAGNOSIS — Z91.018 ALLERGY TO OTHER FOODS: ICD-10-CM

## 2022-06-22 VITALS — WEIGHT: 145 LBS | HEIGHT: 60 IN | BODY MASS INDEX: 28.47 KG/M2

## 2022-06-24 ENCOUNTER — APPOINTMENT (OUTPATIENT)
Dept: ORTHOPEDIC SURGERY | Facility: CLINIC | Age: 37
End: 2022-06-24

## 2022-06-24 DIAGNOSIS — Z96.651 PRESENCE OF RIGHT ARTIFICIAL KNEE JOINT: ICD-10-CM

## 2022-06-24 DIAGNOSIS — Z96.652 PRESENCE OF LEFT ARTIFICIAL KNEE JOINT: ICD-10-CM

## 2022-06-24 PROCEDURE — 99024 POSTOP FOLLOW-UP VISIT: CPT

## 2022-06-24 RX ORDER — OXYCODONE HYDROCHLORIDE 10 MG/1
10 TABLET, FILM COATED, EXTENDED RELEASE ORAL
Qty: 20 | Refills: 0 | Status: ACTIVE | COMMUNITY
Start: 2022-06-24 | End: 1900-01-01

## 2022-06-24 RX ORDER — MORPHINE SULFATE 10 MG/1
10 CAPSULE, EXTENDED RELEASE ORAL
Qty: 20 | Refills: 0 | Status: ACTIVE | COMMUNITY
Start: 2022-06-24 | End: 1900-01-01

## 2022-06-24 NOTE — PROCEDURE
[de-identified] : Observation on incision dry, clean, intact, well healed. Method staple removing kit. Suture site Cleaned with iodine swab after sutures are completely removed. Instructions Keep incision dry and clean, allowed to shower and pat site dry, do not rub dry, contact office is site becomes red, swollen, infected, or you develop a fever. \par \par

## 2022-06-24 NOTE — HISTORY OF PRESENT ILLNESS
[Clean/Dry/Intact] : clean, dry and intact [Healed] : healed [Swelling] : swollen [Neuro Intact] : an unremarkable neurological exam [Vascular Intact] : ~T peripheral vascular exam normal [Negative Mildred's] : maneuvers demonstrated a negative Mildred's sign [Doing Well] : is doing well [No Sign of Infection] : is showing no signs of infection [Adequate Pain Control] : has adequate pain control [Staples Removed] : staples were removed [Chills] : no chills [Constipation] : no constipation [Diarrhea] : no diarrhea [Dysuria] : no dysuria [Fever] : no fever [Nausea] : no nausea [Vomiting] : no vomiting [Erythema] : not erythematous [Discharge] : absent of discharge [Dehiscence] : not dehisced [de-identified] : Post-op visit [de-identified] : Patient presents today for the F/U S/P Bilateral TKR done 3 weeks ago. Patient is doing well and undergoing P.T. with improvement. Takes pain meds and DVT prophylaxis. I went over post-op care and answered all her questions. I also provided her with surgical report for her records. [de-identified] : ROM 0-75 Bilateral [de-identified] : Continue P.T., pain management and DVT prophylaxis. F/U in 1 month with x-rays.

## 2022-07-07 ENCOUNTER — NON-APPOINTMENT (OUTPATIENT)
Age: 37
End: 2022-07-07

## 2022-07-07 RX ORDER — OXYCODONE 5 MG/1
5 TABLET ORAL
Qty: 40 | Refills: 0 | Status: ACTIVE | COMMUNITY
Start: 2022-06-24 | End: 1900-01-01

## 2022-07-22 ENCOUNTER — APPOINTMENT (OUTPATIENT)
Dept: ORTHOPEDIC SURGERY | Facility: CLINIC | Age: 37
End: 2022-07-22

## 2022-07-22 VITALS — WEIGHT: 142 LBS | HEIGHT: 60 IN | BODY MASS INDEX: 27.88 KG/M2

## 2022-07-22 DIAGNOSIS — Z96.653 AFTERCARE FOLLOWING JOINT REPLACEMENT SURGERY: ICD-10-CM

## 2022-07-22 DIAGNOSIS — Z47.1 AFTERCARE FOLLOWING JOINT REPLACEMENT SURGERY: ICD-10-CM

## 2022-07-22 PROCEDURE — 73562 X-RAY EXAM OF KNEE 3: CPT | Mod: 50

## 2022-07-22 PROCEDURE — 99024 POSTOP FOLLOW-UP VISIT: CPT

## 2022-07-22 NOTE — PHYSICAL EXAM
[de-identified] : Left knee: \par Inspection: no effusion\par Wounds: healed midline incision\par Alignment: normal.\par Palpation: no specific tenderness on palpation.\par ROM: Active (in degrees): 0-90\par Ligamentous laxity (neg): negative ant. drawer test, negative post. drawer test, stable to varus stress test, stable to valgus stress test,\par Patellofemoral Alignment Test: Q angle-, normal.\par Muscle Test: good quad strength.\par Leg examination: calf is soft and non-tender.”\par \par \par Right knee: \par Inspection: no effusion\par Wounds: healed midline incision\par Alignment: normal.\par Palpation: no specific tenderness on palpation.\par ROM: Active (in degrees): 0-90\par Ligamentous laxity (neg): negative ant. drawer test, negative post. drawer test, stable to varus stress test, stable to valgus stress test,\par Patellofemoral Alignment Test: Q angle-, normal.\par Muscle Test: good quad strength.\par Leg examination: calf is soft and non-tender.”

## 2022-07-22 NOTE — HISTORY OF PRESENT ILLNESS
[de-identified] : 36 year old female who is 7 weeks s/p bilateral TKR. She has been going to therapy 4x a week. She states her ROM on the right is 96 degrees of flexion actively and 105 degrees passively and on the Left 88 degrees actively and 94 degrees passively. Her physical therapist wrote a note that she is limited to her ROM due to guarding. Patient is c/o tightness in the left knee. She has pain at night and takes oxycodone 5mg at night and 10mg before PT. She also takes Motrin/tylenol as needed. She is using walker out of house and cane in house. She feels her balance is off. She also takes Mobic and is planning to restart.

## 2022-07-22 NOTE — ADDENDUM
[FreeTextEntry1] : This note was written by Aris Serrano on 07/22/2022 acting as scribe for Dr. Bartolo Harris M.D.\par \par I, Dr. Bartolo Harris, have read and attest that all the information, medical decision making and discharge instructions within are true and accurate.

## 2022-07-22 NOTE — DISCUSSION/SUMMARY
[de-identified] : Patient doing well s/p bilateral TKR. She will continue to work with PT to regain motion and strength. She may take Mobic as needed and she still takes oxycodone for PT. XR films were reviewed with the patient. I reassured Her implants are functioning properly. Patient can continue activities as tolerated. All questions answered, understanding verbalized. Patient in agreement with plan of care. Patient may follow up with X-rays in 6-8 weeks.\par \par

## 2022-09-02 ENCOUNTER — APPOINTMENT (OUTPATIENT)
Dept: ORTHOPEDIC SURGERY | Facility: CLINIC | Age: 37
End: 2022-09-02

## 2022-09-02 VITALS — WEIGHT: 145 LBS | BODY MASS INDEX: 28.47 KG/M2 | HEIGHT: 60 IN

## 2022-09-02 PROCEDURE — 73562 X-RAY EXAM OF KNEE 3: CPT | Mod: 50

## 2022-09-02 PROCEDURE — 99213 OFFICE O/P EST LOW 20 MIN: CPT

## 2022-09-02 NOTE — PHYSICAL EXAM
[de-identified] : General appearance: well nourished and hydrated, pleasant, alert and oriented x 3, cooperative.\par HEENT: Normocephalic, EOM intact, Nasal septum midline, Oral cavity clear, External auditory canal clear.\par Cardiovascular: no apparent abnormalities, no lower leg edema, no varicosities, pedal pulses are palpable.\par Lymphatics Lymph nodes: none palpated, Lymphedema: not present.\par Neurologic: sensation is normal, no muscle weakness in upper or lower extremities, patella tendon reflexes intact .\par Dermatologic no apparent skin lesions, moist, warm, no rash.\par Spine:cervical spine appears normal and moves freely, thoracic spine appears normal and moves freely, lumbosacral spine appears normal and moves freely.\par Gait: nonantalgic.\par \par Right Knee\par Inspection: no effusion\par Wounds: healed midline incision\par Alignment: normal.\par Palpation: no specific tenderness on palpation.\par ROM: Active (in degrees): 0-125\par Ligamentous laxity (neg): negative ant. drawer test, negative post. drawer test, stable to varus stress test, stable to valgus stress test,\par Patellofemoral Alignment Test: Q angle-, normal.\par Muscle Test: good quad strength.\par Leg examination: calf is soft and non-tender.\par \par Left Knee\par Inspection: no effusion\par Wounds: healed midline incision\par Alignment: normal.\par Palpation: no specific tenderness on palpation.\par ROM: Active (in degrees): 0-110\par Ligamentous laxity (neg): negative ant. drawer test, negative post. drawer test, stable to varus stress test, stable to valgus stress test,\par Patellofemoral Alignment Test: Q angle-, normal.\par Muscle Test: good quad strength.\par Leg examination: calf is soft and non-tender. [de-identified] : Right knee xray, AP, lateral, merchant view taken at the office today demonstrates a total knee replacement in satisfactory position and alignment. No evidence of loosening. The patella sits in a central position. Unresurfaced patella\par \par Left knee xray, AP, lateral, merchant view taken at the office today demonstrates a total knee replacement in satisfactory position and alignment. No evidence of loosening. The patella sits in a central position. Unresurfaced patella

## 2022-09-02 NOTE — HISTORY OF PRESENT ILLNESS
[de-identified] : CHAVEZ SAMLLS is a 36 year old female who presents for follow up evaluation s/p b/t TKR at 3 months. She is still using a cane to walk around. Her left knee is not as strong as the right knee.

## 2022-09-02 NOTE — DISCUSSION/SUMMARY
[de-identified] : Pt is doing well s/p bilateral TKR. Patient can continue with cane for ambulation and activities as tolerated. All questions answered, understanding verbalized. Patient in agreement with plan of care.\par \par I will see her back in 3 months with xrays.

## 2022-09-02 NOTE — ADDENDUM
[FreeTextEntry1] : This note was written by Federica Avelar on 09/02/2022 acting as scribe for Dr. Bartolo Harris M.D.\par \par I, Dr. Bartolo Harris, have read and attest that all the information, medical decision making and discharge instructions within are true and accurate.

## 2022-09-28 ENCOUNTER — APPOINTMENT (OUTPATIENT)
Dept: ORTHOPEDIC SURGERY | Facility: CLINIC | Age: 37
End: 2022-09-28

## 2022-09-28 PROCEDURE — 73562 X-RAY EXAM OF KNEE 3: CPT | Mod: 50

## 2022-09-28 PROCEDURE — 99213 OFFICE O/P EST LOW 20 MIN: CPT

## 2022-09-28 NOTE — DISCUSSION/SUMMARY
[de-identified] : Pt is s/p bilateral TKR at 4 months. She is doing well. She has small granuloma on lateral aspect on left knee, varma not appear to be related to a stitch. Explained to her that she should avoid shaving in that area to allow the granuloma to heal. Patient can continue activities as tolerated. All questions answered, understanding verbalized. Patient in agreement with plan of care.\par \par I will see her back in 3 months with xrays.

## 2022-09-28 NOTE — PHYSICAL EXAM
[de-identified] : General appearance: well nourished and hydrated, pleasant, alert and oriented x 3, cooperative.\par HEENT: Normocephalic, EOM intact, Nasal septum midline, Oral cavity clear, External auditory canal clear.\par Cardiovascular: no apparent abnormalities, no lower leg edema, no varicosities, pedal pulses are palpable.\par Lymphatics Lymph nodes: none palpated, Lymphedema: not present.\par Neurologic: sensation is normal, no muscle weakness in upper or lower extremities, patella tendon reflexes intact .\par Dermatologic no apparent skin lesions, moist, warm, no rash.\par Spine:cervical spine appears normal and moves freely, thoracic spine appears normal and moves freely, lumbosacral spine appears normal and moves freely.\par Gait: nonantalgic.\par \par Left Knee\par Inspection: no effusion, small granuloma on lateral aspect\par Wounds: healed midline incision\par Alignment: normal.\par Palpation: no specific tenderness on palpation.\par ROM: Active (in degrees): 0-110\par Ligamentous laxity (neg): negative ant. drawer test, negative post. drawer test, stable to varus stress test, stable to valgus stress test,\par Patellofemoral Alignment Test: Q angle-, normal.\par Muscle Test: good quad strength.\par Leg examination: calf is soft and non-tender.\par \par Right Knee\par Inspection: no effusion\par Wounds: healed midline incision\par Alignment: normal.\par Palpation: no specific tenderness on palpation.\par ROM: Active (in degrees): 0-120\par Ligamentous laxity (neg): negative ant. drawer test, negative post. drawer test, stable to varus stress test, stable to valgus stress test,\par Patellofemoral Alignment Test: Q angle-, normal.\par Muscle Test: good quad strength.\par Leg examination: calf is soft and non-tender. [de-identified] : Left knee xray, AP, lateral, merchant view taken at the office today demonstrates a total knee replacement in satisfactory position and alignment. No evidence of loosening. The patella sits in a central position.\par \par Right knee xray, AP, lateral, merchant view taken at the office today demonstrates a total knee replacement in satisfactory position and alignment. No evidence of loosening. The patella sits in a central position.

## 2022-09-28 NOTE — ADDENDUM
[FreeTextEntry1] : This note was written by Federica Avelar on 09/28/2022 acting as scribe for Dr. Bartolo Harris M.D.\par \par I, Dr. Bartolo Harris, have read and attest that all the information, medical decision making and discharge instructions within are true and accurate.

## 2022-09-28 NOTE — HISTORY OF PRESENT ILLNESS
[de-identified] : CHAVEZ SMALLS is a 37 year old female who presents for follow up evaluation of s/p bilateral TKR at 4 months. Goes to PT 3x a week. Reports some pain, takes Mobic prn. Pt did call about 2 weeks ago, noticed a bump on left knee, skin granuloma, causes pain with pressure. States when she shaves gets a nick in the skin around that area.

## 2022-11-30 ENCOUNTER — APPOINTMENT (OUTPATIENT)
Dept: ORTHOPEDIC SURGERY | Facility: CLINIC | Age: 37
End: 2022-11-30

## 2022-11-30 PROCEDURE — 99213 OFFICE O/P EST LOW 20 MIN: CPT

## 2022-11-30 PROCEDURE — 73562 X-RAY EXAM OF KNEE 3: CPT | Mod: 50

## 2022-11-30 NOTE — HISTORY OF PRESENT ILLNESS
[de-identified] : CHAVEZ SMALLS is a 37 year old female who presents for follow up evaluation s/p bilateral TKR at 6 months. She is going to PT 2-3x week. Denies pain but reports tightness and swelling, especially after activities. Granuloma on the left knee is still there, only gets in her way when shaving, otherwise causes no issues.

## 2022-11-30 NOTE — PHYSICAL EXAM
[de-identified] : General appearance: well nourished and hydrated, pleasant, alert and oriented x 3, cooperative.\par HEENT: Normocephalic, EOM intact, Nasal septum midline, Oral cavity clear, External auditory canal clear.\par Cardiovascular: no apparent abnormalities, no lower leg edema, no varicosities, pedal pulses are palpable.\par Lymphatics Lymph nodes: none palpated, Lymphedema: not present.\par Neurologic: sensation is normal, no muscle weakness in upper or lower extremities, patella tendon reflexes intact .\par Dermatologic no apparent skin lesions, moist, warm, no rash.\par Spine:cervical spine appears normal and moves freely, thoracic spine appears normal and moves freely, lumbosacral spine appears normal and moves freely.\par Gait: nonantalgic.\par \par Left Knee\par Inspection: no effusion, small granuloma on lateral aspect\par Wounds: healed midline incision\par Alignment: normal.\par Palpation: no specific tenderness on palpation.\par ROM: Active (in degrees): 0-130\par Ligamentous laxity (neg): negative ant. drawer test, negative post. drawer test, stable to varus stress test, stable to valgus stress test,\par Patellofemoral Alignment Test: Q angle-, normal.\par Muscle Test: good quad strength.\par Leg examination: calf is soft and non-tender.\par \par Right Knee\par Inspection: no effusion\par Wounds: healed midline incision\par Alignment: normal.\par Palpation: no specific tenderness on palpation.\par ROM: Active (in degrees): 0-130\par Ligamentous laxity (neg): negative ant. drawer test, negative post. drawer test, stable to varus stress test, stable to valgus stress test,\par Patellofemoral Alignment Test: Q angle-, normal.\par Muscle Test: good quad strength.\par Leg examination: calf is soft and non-tender. [de-identified] : Left knee xray, AP, lateral, merchant view taken at the office today demonstrates a total knee replacement in satisfactory position and alignment. No evidence of loosening. Unresurfaced patella \par \par Right knee xray, AP, lateral, merchant view taken at the office today demonstrates a total knee replacement in satisfactory position and alignment. No evidence of loosening. Unresurfaced patella

## 2022-11-30 NOTE — DISCUSSION/SUMMARY
[de-identified] : Pt is s/p bilateral TKR at 6 months. Patient can continue with home exercises and activities as tolerated. All questions answered, understanding verbalized. Patient in agreement with plan of care.\par \par I will see her back in 1 year with xrays.

## 2022-11-30 NOTE — ADDENDUM
[FreeTextEntry1] : This note was written by Federica Avelar on 11/30/2022 acting as scribe for Dr. Bartolo Harris M.D.\par \par I, Dr. Bartolo Harris, have read and attest that all the information, medical decision making and discharge instructions within are true and accurate.

## 2023-04-21 ENCOUNTER — APPOINTMENT (OUTPATIENT)
Dept: ORTHOPEDIC SURGERY | Facility: CLINIC | Age: 38
End: 2023-04-21
Payer: COMMERCIAL

## 2023-04-21 DIAGNOSIS — S83.92XA SPRAIN OF UNSPECIFIED SITE OF LEFT KNEE, INITIAL ENCOUNTER: ICD-10-CM

## 2023-04-21 PROCEDURE — 99213 OFFICE O/P EST LOW 20 MIN: CPT

## 2023-04-21 PROCEDURE — 73562 X-RAY EXAM OF KNEE 3: CPT | Mod: 50

## 2023-04-21 NOTE — DISCUSSION/SUMMARY
[de-identified] : Following up with bilateral TKR with an acute problem of their left knee since yesterday. She appears to have a lateral capsular sprain, however the knee is stable and there is no damage to the implant. I reviewed x-ray films with them. I suggested a knee sleeve especially at work, cryotherapy at the end of the day, a home exercise program, and Advil or Aleve prn. I explained it may take several weeks for her symptoms to resolve.\par \par Their Right TKR is doing well\par \par I will see them back in 3-4 weeks with x-ray.

## 2023-04-21 NOTE — ADDENDUM
[FreeTextEntry1] : This note was written by Federica Avelar on 04/21/2023 acting as scribe for Dr. Bartolo Harris M.D.\par \par I, Dr. Bartolo Harris, have read and attest that all the information, medical decision making and discharge instructions within are true and accurate.\par \par This note was written by WANG MELGAR on 04/21/2023 acting as scribe for Dr. Bartolo Harris M.D.\par \par I, Dr. Bartolo Harris, have read and attest that all the information, medical decision making and discharge instructions within are true and accurate.

## 2023-04-21 NOTE — PHYSICAL EXAM
[de-identified] : General appearance: well nourished and hydrated, pleasant, alert and oriented x 3, cooperative.\par HEENT: Normocephalic, EOM intact, Nasal septum midline, Oral cavity clear, External auditory canal clear.\par Cardiovascular: no apparent abnormalities, no lower leg edema, no varicosities, pedal pulses are palpable.\par Lymphatics Lymph nodes: none palpated, Lymphedema: not present.\par Neurologic: sensation is normal, no muscle weakness in upper or lower extremities, patella tendon reflexes intact .\par Dermatologic no apparent skin lesions, moist, warm, no rash.\par Spine:cervical spine appears normal and moves freely, thoracic spine appears normal and moves freely, lumbosacral spine appears normal and moves freely.\par Gait: nonantalgic.\par \par Left Knee\par Inspection: no effusion, diffuse soft tissue swelling \par Wounds: healed midline incision \par Alignment: normal.\par Palpation: lateral joint line tenderness and discomfort over LCL on palpation.\par ROM: Active (in degrees): 0-110 with pain and apprehension\par Ligamentous laxity (neg): negative ant. drawer test, negative post. drawer test, stable to varus stress test, stable to valgus stress test,\par Patellofemoral Alignment Test: Q angle-, normal.\par Muscle Test: good quad strength.\par Leg examination: calf is soft and non-tender.\par \par Right Knee\par Inspection: no effusion\par Wounds: healed midline incision\par Alignment: normal.\par Palpation: no specific tenderness on palpation.\par ROM: Active (in degrees): 0-125 with mild crepitus \par Ligamentous laxity (neg): negative ant. drawer test, negative post. drawer test, stable to varus stress test, stable to valgus stress test,\par Patellofemoral Alignment Test: Q angle-, normal.\par Muscle Test: good quad strength.\par Leg examination: calf is soft and non-tender.  [de-identified] : Left knee xray, AP, lateral, merchant view taken at the office today demonstrates a total knee replacement in satisfactory position and alignment. No evidence of loosening. Unresurfaced patella \par \par Right knee xray, AP, lateral, merchant view taken at the office today demonstrates a total knee replacement in satisfactory position and alignment. No evidence of loosening. Unresurfaced patella.

## 2023-04-21 NOTE — HISTORY OF PRESENT ILLNESS
[de-identified] : CHAVEZ SMALLS is a 37 year old female who presents for follow up evaluation s/p b/l TKR at 3 months. Pt notes falling last night and landed on her right side and twisted her left knee. Reports mild discomfort in the right knee, but more in the left. States she has swelling and significant pain, which limits her activities. Notes she took Mobic and iced the knee with some improvement.

## 2023-05-31 ENCOUNTER — APPOINTMENT (OUTPATIENT)
Dept: ORTHOPEDIC SURGERY | Facility: CLINIC | Age: 38
End: 2023-05-31
Payer: COMMERCIAL

## 2023-05-31 VITALS — WEIGHT: 148 LBS | HEIGHT: 60 IN | BODY MASS INDEX: 29.06 KG/M2

## 2023-05-31 DIAGNOSIS — M17.0 BILATERAL PRIMARY OSTEOARTHRITIS OF KNEE: ICD-10-CM

## 2023-05-31 DIAGNOSIS — Z96.653 PRESENCE OF ARTIFICIAL KNEE JOINT, BILATERAL: ICD-10-CM

## 2023-05-31 PROCEDURE — 99213 OFFICE O/P EST LOW 20 MIN: CPT

## 2023-05-31 PROCEDURE — 73562 X-RAY EXAM OF KNEE 3: CPT | Mod: 50

## 2023-05-31 NOTE — PHYSICAL EXAM
[de-identified] : General appearance: well nourished and hydrated, pleasant, alert and oriented x 3, cooperative.\par HEENT: Normocephalic, EOM intact, Nasal septum midline, Oral cavity clear, External auditory canal clear.\par Cardiovascular: no apparent abnormalities, no lower leg edema, no varicosities, pedal pulses are palpable.\par Lymphatics Lymph nodes: none palpated, Lymphedema: not present.\par Neurologic: sensation is normal, no muscle weakness in upper or lower extremities, patella tendon reflexes intact .\par Dermatologic no apparent skin lesions, moist, warm, no rash.\par Spine:cervical spine appears normal and moves freely, thoracic spine appears normal and moves freely, lumbosacral spine appears normal and moves freely.\par Gait: nonantalgic.\par \par Left Knee\par Inspection: no effusion, diffuse soft tissue swelling \par Wounds: healed midline incision \par Alignment: normal.\par Palpation: lateral joint line tenderness and discomfort over LCL on palpation.\par ROM: Active (in degrees): 0-135\par Ligamentous laxity (neg): negative ant. drawer test, negative post. drawer test, stable to varus stress test, stable to valgus stress test,\par Patellofemoral Alignment Test: Q angle-, normal.\par Muscle Test: good quad strength.\par Leg examination: calf is soft and non-tender.\par \par Right Knee\par Inspection: no effusion\par Wounds: healed midline incision\par Alignment: normal.\par Palpation: no specific tenderness on palpation.\par ROM: Active (in degrees): 0-135\par Ligamentous laxity (neg): negative ant. drawer test, negative post. drawer test, stable to varus stress test, stable to valgus stress test,\par Patellofemoral Alignment Test: Q angle-, normal.\par Muscle Test: good quad strength.\par Leg examination: calf is soft and non-tender.  [de-identified] : Left knee x-ray, AP, lateral, merchant view taken at the office today demonstrates a total knee replacement in satisfactory position and alignment. No evidence of loosening. Un-resurfaced patella. Suture anchor on medial. \par \par Right knee x-ray, AP, lateral, merchant view taken at the office today demonstrates a total knee replacement in satisfactory position and alignment. No evidence of loosening. Un-resurfaced patella.

## 2023-05-31 NOTE — DISCUSSION/SUMMARY
[de-identified] : Patient is doing well following their s/p Bilateral TKR. I reviewed x-rays with them. I have reassured them that their implants are functioning well.\par \par She is encouraged to continue to stay active with PT for strength and endurance.\par \par I will see them back in 1 year.

## 2023-05-31 NOTE — ADDENDUM
[FreeTextEntry1] : This note was written by WANG MELGAR on 05/31/2023 acting as scribe for Dr. Bartolo Harris M.D.\par \par I, Dr. Bartolo Harris, have read and attest that all the information, medical decision making and discharge instructions within are true and accurate. \par \par This note was written by Carlito Clement on 05/31/2023 acting as scribe for Dr. Bartolo Harris M.D.\par \par I, Dr. Bartolo Harris, have read and attest that all the information, medical decision making and discharge instructions within are true and accurate.

## 2023-05-31 NOTE — HISTORY OF PRESENT ILLNESS
[de-identified] : CHAVEZ SMALLS is a 37 year old female who presents for follow up evaluation of s/p B/L TKR. She continues to struggle with going up and down stairs because of quad weakness. She uses an exercise bike. She does not have any pain nor does she take any pain medication. She is very busy and back to work now. She feels as if she is making good progress.

## 2023-06-06 NOTE — H&P PST ADULT - [facility]
72-year-old female new to the clinic here for an ER follow-up.  Past medical history of anxiety, depression, GERD, hypertension, DM type 2.    Seen at [facility] Emergency Department 6/4/23 for abdominal pain for the past 4 to 5 months.  Was noted she was seen in the ER 5 months prior to her most recent visit had a full work-up including CT abdomen and pelvis with IV contrast discharged home with diagnosis of urinary frequency and lower abdominal pain was referred to Dr. Mix outpatient.  Apparently her appointment with Dr. Mix is still 3 months away.  Having some tenderness to suprapubic region.  He was found to have some inflammatory versus infectious terminal ileum and cecal pole findings but no abscess or acute appendicitis.  Discharged on Cipro, Flagyl and Reglan with docusate to help with constipation.  CT abdomen and pelvis without contrast 6/4/2023.  Significant gastric luminal distention could be related to large recent intake versus delayed gastric emptying.  Clinical correlation is needed.  No obvious mass.  Low to moderate amount of fecal retention.  Colonic diverticulosis.  Mesenteric stranding right lower quadrant/pericecal region.  No inflamed appendix therefore findings favor terminal ileitis due to infectious/inflammatory etiology including Crohn's disease.  Probable minimal regional cecal wall thickening.  Follow-up exam with luminal bowel contrast and IV contrast may be helpful when possible.  Labs 6/4/2023.  CBC: Normal WBC at 7.6, Hgb 11.9, HCT 34.9 with normal RBC and MCV at 87.  CMP: Sodium 129, chloride 93, ALT 13, glucose 180.  With a large amount of leukocytes and small amount of blood with 2+ bacteria.  Culture was positive for E. coli.  On interview today she is here with her grand-daughter who is translating.  She is feeling better on the antibiotics. She usually has 1-2 BM's a day type 4 on the Simms scale prior to her ER visit but recently abnormal.   NO melena, hematochezia or mucous.  No nausea. Only has some dry heaves if she gets to coughing too much.  No dysphagia. GERD is diet and lifestyle controlled.  No CP, SOB, palpitations, syncope, near-syncope or problems with anesthesia previously.   Last colonoscopy was over 10 years ago. No prior polyps. regular rate and rhythm

## 2024-04-26 NOTE — PATIENT PROFILE ADULT - FALL HARM RISK - UNIVERSAL INTERVENTIONS
Emergency Department TeleTriage Encounter Note      CHIEF COMPLAINT    Chief Complaint   Patient presents with    Fall     Fell two day ago, has visible hematoma on left side of face and having pain on left side (rib area) and left leg and arm - patient is on blood thinners       VITAL SIGNS   Initial Vitals [04/26/24 1146]   BP Pulse Resp Temp SpO2   126/74 90 18 97.7 °F (36.5 °C) 95 %      MAP       --            ALLERGIES    Review of patient's allergies indicates:   Allergen Reactions    Montelukast Other (See Comments)     Depleted sodium     Ciprofloxacin     Hydrochlorothiazide Other (See Comments)     Low sodium       PROVIDER TRIAGE NOTE  Patient presents with complaint of facial injury and left-sided rib pain after a fall 2 days ago.  Denies loss of consciousness.      Phy:   Constitutional: well nourished, well developed, appearing stated age, NAD        Initial orders will be placed and care will be transferred to an alternate provider when patient is roomed for a full evaluation. Any additional orders and the final disposition will be determined by that provider.        ORDERS  Labs Reviewed - No data to display    ED Orders (720h ago, onward)      Start Ordered     Status Ordering Provider    04/26/24 1207 04/26/24 1206  CT Head Without Contrast  1 time imaging         Ordered MATILDA YEN    04/26/24 1207 04/26/24 1206  XR Ribs Min 3 views w/PA Chest Left  1 time imaging         Ordered MATILDA YEN              Virtual Visit Note: The provider triage portion of this emergency department evaluation and documentation was performed via [employer], a HIPAA-compliant telemedicine application, in concert with a tele-presenter in the room. A face to face patient evaluation with one of my colleagues will occur once the patient is placed in an emergency department room.      DISCLAIMER: This note was prepared with [employer] voice recognition transcription software. Nagi  syntax, mangled pronouns, and other bizarre constructions may be attributed to that software system.     Bed in lowest position, wheels locked, appropriate side rails in place/Call bell, personal items and telephone in reach/Instruct patient to call for assistance before getting out of bed or chair/Non-slip footwear when patient is out of bed/[place] to call system/Physically safe environment - no spills, clutter or unnecessary equipment/Purposeful Proactive Rounding/Room/bathroom lighting operational, light cord in reach

## 2024-06-03 ENCOUNTER — OFFICE (OUTPATIENT)
Dept: URBAN - METROPOLITAN AREA CLINIC 35 | Facility: CLINIC | Age: 39
Setting detail: OPHTHALMOLOGY
End: 2024-06-03
Payer: COMMERCIAL

## 2024-06-03 ENCOUNTER — RX ONLY (RX ONLY)
Age: 39
End: 2024-06-03

## 2024-06-03 DIAGNOSIS — H16.421: ICD-10-CM

## 2024-06-03 DIAGNOSIS — H10.9: ICD-10-CM

## 2024-06-03 PROCEDURE — 92002 INTRM OPH EXAM NEW PATIENT: CPT | Performed by: OPHTHALMOLOGY

## 2024-06-11 ENCOUNTER — OFFICE (OUTPATIENT)
Dept: URBAN - METROPOLITAN AREA CLINIC 35 | Facility: CLINIC | Age: 39
Setting detail: OPHTHALMOLOGY
End: 2024-06-11
Payer: COMMERCIAL

## 2024-06-11 DIAGNOSIS — H10.9: ICD-10-CM

## 2024-06-11 DIAGNOSIS — H16.421: ICD-10-CM

## 2024-06-11 DIAGNOSIS — H40.033: ICD-10-CM

## 2024-06-11 PROCEDURE — 92012 INTRM OPH EXAM EST PATIENT: CPT | Performed by: OPHTHALMOLOGY

## 2024-06-26 ENCOUNTER — OFFICE (OUTPATIENT)
Dept: URBAN - METROPOLITAN AREA CLINIC 35 | Facility: CLINIC | Age: 39
Setting detail: OPHTHALMOLOGY
End: 2024-06-26
Payer: COMMERCIAL

## 2024-06-26 DIAGNOSIS — H40.033: ICD-10-CM

## 2024-06-26 DIAGNOSIS — H16.421: ICD-10-CM

## 2024-06-26 PROBLEM — H10.9 CONJUNCTIVITIS, UNSPECIFIED: Status: RESOLVED | Noted: 2024-06-03 | Resolved: 2024-06-26

## 2024-06-26 PROCEDURE — 92083 EXTENDED VISUAL FIELD XM: CPT | Performed by: OPHTHALMOLOGY

## 2024-06-26 PROCEDURE — 92020 GONIOSCOPY: CPT | Performed by: OPHTHALMOLOGY

## 2024-06-26 PROCEDURE — 92012 INTRM OPH EXAM EST PATIENT: CPT | Performed by: OPHTHALMOLOGY

## 2024-06-26 PROCEDURE — 92133 CPTRZD OPH DX IMG PST SGM ON: CPT | Performed by: OPHTHALMOLOGY

## 2024-07-01 ENCOUNTER — OFFICE (OUTPATIENT)
Dept: URBAN - METROPOLITAN AREA CLINIC 35 | Facility: CLINIC | Age: 39
Setting detail: OPHTHALMOLOGY
End: 2024-07-01
Payer: COMMERCIAL

## 2024-07-01 DIAGNOSIS — H40.031: ICD-10-CM

## 2024-07-01 PROBLEM — H16.421 CORNEAL PANNUS; RIGHT EYE: Status: ACTIVE | Noted: 2024-06-03

## 2024-07-01 PROCEDURE — 66761 REVISION OF IRIS: CPT | Mod: RT | Performed by: OPHTHALMOLOGY

## 2024-07-08 ENCOUNTER — OFFICE (OUTPATIENT)
Dept: URBAN - METROPOLITAN AREA CLINIC 35 | Facility: CLINIC | Age: 39
Setting detail: OPHTHALMOLOGY
End: 2024-07-08
Payer: COMMERCIAL

## 2024-07-08 DIAGNOSIS — H40.032: ICD-10-CM

## 2024-07-08 PROCEDURE — 66761 REVISION OF IRIS: CPT | Mod: 79,LT | Performed by: OPHTHALMOLOGY

## 2024-07-15 ENCOUNTER — DOCTOR'S OFFICE (OUTPATIENT)
Age: 39
Setting detail: OPHTHALMOLOGY
End: 2024-07-15
Payer: COMMERCIAL

## 2024-07-15 DIAGNOSIS — H40.032: ICD-10-CM

## 2024-07-15 DIAGNOSIS — H40.031: ICD-10-CM

## 2024-07-15 PROCEDURE — 99024 POSTOP FOLLOW-UP VISIT: CPT | Performed by: OPHTHALMOLOGY

## 2024-07-25 PROBLEM — H40.033 NARROW ANGLE GLAUCOMA SUSPECT; ,, BOTH EYES: Status: ACTIVE | Noted: 2024-07-15

## 2024-08-12 ENCOUNTER — DOCTOR'S OFFICE (OUTPATIENT)
Age: 39
Setting detail: OPHTHALMOLOGY
End: 2024-08-12
Payer: COMMERCIAL

## 2024-08-12 DIAGNOSIS — H16.421: ICD-10-CM

## 2024-08-12 DIAGNOSIS — H40.033: ICD-10-CM

## 2024-08-12 PROCEDURE — 92014 COMPRE OPH EXAM EST PT 1/>: CPT | Performed by: OPHTHALMOLOGY

## 2024-08-12 ASSESSMENT — CONFRONTATIONAL VISUAL FIELD TEST (CVF)
OS_FINDINGS: FULL
OD_FINDINGS: FULL

## 2024-10-14 ENCOUNTER — DOCTOR'S OFFICE (OUTPATIENT)
Facility: LOCATION | Age: 39
Setting detail: OPHTHALMOLOGY
End: 2024-10-14
Payer: COMMERCIAL

## 2024-10-14 DIAGNOSIS — H40.031: ICD-10-CM

## 2024-10-14 PROCEDURE — 66761 REVISION OF IRIS: CPT | Mod: RT | Performed by: OPHTHALMOLOGY

## 2024-10-14 ASSESSMENT — CONFRONTATIONAL VISUAL FIELD TEST (CVF)
OD_FINDINGS: FULL
OS_FINDINGS: FULL

## 2024-10-14 ASSESSMENT — REFRACTION_CURRENTRX
OS_VPRISM_DIRECTION: PROGS
OS_CYLINDER: +0.25
OD_SPHERE: +4.50
OS_AXIS: 139
OD_ADD: +0.75
OS_ADD: +1.25
OD_OVR_VA: 20/
OD_AXIS: 041
OS_ADD: +1.50
OD_AXIS: 111
OD_OVR_VA: 20/
OS_AXIS: 055
OD_CYLINDER: +1.25
OD_CYLINDER: -0.75
OD_VPRISM_DIRECTION: PROGS
OS_SPHERE: +3.00
OS_OVR_VA: 20/
OS_OVR_VA: 20/
OS_CYLINDER: -0.25
OD_ADD: +1.50
OD_SPHERE: +3.50
OS_SPHERE: +3.25

## 2024-10-14 ASSESSMENT — KERATOMETRY
OD_AXISANGLE_DEGREES: 047
OD_K1POWER_DIOPTERS: 45.75
OS_AXISANGLE_DEGREES: 133
OS_K2POWER_DIOPTERS: 47.00
OD_K2POWER_DIOPTERS: 47.50
OS_K1POWER_DIOPTERS: 46.00

## 2024-10-14 ASSESSMENT — REFRACTION_AUTOREFRACTION
OD_AXIS: 123
OD_SPHERE: +6.00
OS_AXIS: 063
OS_CYLINDER: -1.25
OS_SPHERE: +4.50
OD_CYLINDER: -2.00

## 2024-10-14 ASSESSMENT — VISUAL ACUITY
OS_BCVA: 20/40
OD_BCVA: 20/20-2

## 2024-10-14 ASSESSMENT — VASCULARIZATION: OD_VASCULARIZATION: PANNUS

## 2024-10-21 ENCOUNTER — DOCTOR'S OFFICE (OUTPATIENT)
Facility: LOCATION | Age: 39
Setting detail: OPHTHALMOLOGY
End: 2024-10-21
Payer: COMMERCIAL

## 2024-10-21 DIAGNOSIS — H40.032: ICD-10-CM

## 2024-10-21 PROBLEM — H40.033 NARROW ANGLE GLAUCOMA SUSPECT; ,, BOTH EYES: Status: ACTIVE | Noted: 2024-10-21

## 2024-10-21 PROCEDURE — 66761 REVISION OF IRIS: CPT | Mod: 79,LT | Performed by: OPHTHALMOLOGY

## 2024-10-21 ASSESSMENT — TONOMETRY
OD_IOP_MMHG: 17
OS_IOP_MMHG: 17

## 2024-10-21 ASSESSMENT — CONFRONTATIONAL VISUAL FIELD TEST (CVF)
OS_FINDINGS: FULL
OD_FINDINGS: FULL

## 2024-10-21 ASSESSMENT — VASCULARIZATION: OD_VASCULARIZATION: PANNUS

## 2024-10-22 ENCOUNTER — RX ONLY (RX ONLY)
Age: 39
End: 2024-10-22

## 2024-10-22 ASSESSMENT — REFRACTION_CURRENTRX
OD_CYLINDER: -0.75
OD_ADD: +0.75
OD_SPHERE: +4.50
OS_AXIS: 055
OD_OVR_VA: 20/
OS_CYLINDER: +0.25
OS_VPRISM_DIRECTION: PROGS
OD_VPRISM_DIRECTION: PROGS
OS_SPHERE: +3.25
OD_ADD: +1.50
OD_CYLINDER: +1.25
OS_CYLINDER: -0.25
OD_OVR_VA: 20/
OS_SPHERE: +3.00
OS_ADD: +1.25
OS_ADD: +1.50
OS_AXIS: 139
OS_OVR_VA: 20/
OD_AXIS: 041
OS_OVR_VA: 20/
OD_SPHERE: +3.50
OD_AXIS: 111

## 2024-10-22 ASSESSMENT — REFRACTION_AUTOREFRACTION
OS_AXIS: 066
OD_CYLINDER: -1.75
OD_SPHERE: +5.25
OS_CYLINDER: -1.00
OD_AXIS: 122
OS_SPHERE: +4.25

## 2024-10-22 ASSESSMENT — VISUAL ACUITY
OS_BCVA: 20/40
OD_BCVA: 20/30

## 2024-10-22 ASSESSMENT — KERATOMETRY
OD_AXISANGLE_DEGREES: 051
OS_K2POWER_DIOPTERS: 47.50
OD_K2POWER_DIOPTERS: 47.75
OD_K1POWER_DIOPTERS: 46.50
OS_K1POWER_DIOPTERS: 46.75
OS_AXISANGLE_DEGREES: 123

## 2024-11-06 ENCOUNTER — DOCTOR'S OFFICE (OUTPATIENT)
Facility: LOCATION | Age: 39
Setting detail: OPHTHALMOLOGY
End: 2024-11-06
Payer: COMMERCIAL

## 2024-11-06 DIAGNOSIS — H53.001: ICD-10-CM

## 2024-11-06 DIAGNOSIS — H40.033: ICD-10-CM

## 2024-11-06 PROCEDURE — 92014 COMPRE OPH EXAM EST PT 1/>: CPT | Performed by: OPHTHALMOLOGY

## 2024-11-06 ASSESSMENT — REFRACTION_AUTOREFRACTION
OD_SPHERE: +5.25
OS_SPHERE: +4.25
OD_AXIS: 122
OS_AXIS: 066
OS_CYLINDER: -1.00
OD_CYLINDER: -1.75

## 2024-11-06 ASSESSMENT — REFRACTION_MANIFEST
OD_SPHERE: +4.75
OD_CYLINDER: -0.75
OD_VA1: 20/30-
OS_AXIS: 055
OS_CYLINDER: -0.25
OS_VA1: 20/20
OD_AXIS: 115
OS_SPHERE: +3.25

## 2024-11-06 ASSESSMENT — REFRACTION_CURRENTRX
OS_SPHERE: +3.25
OD_AXIS: 041
OS_ADD: +1.50
OD_VPRISM_DIRECTION: PROGS
OS_AXIS: 055
OD_SPHERE: +3.50
OD_OVR_VA: 20/
OS_ADD: +1.25
OD_CYLINDER: +1.25
OS_AXIS: 139
OD_ADD: +0.75
OD_CYLINDER: -0.75
OD_AXIS: 111
OS_VPRISM_DIRECTION: PROGS
OD_OVR_VA: 20/
OD_ADD: +1.50
OS_SPHERE: +3.00
OS_CYLINDER: +0.25
OD_SPHERE: +4.50
OS_OVR_VA: 20/
OS_OVR_VA: 20/
OS_CYLINDER: -0.25

## 2024-11-06 ASSESSMENT — KERATOMETRY
OD_AXISANGLE_DEGREES: 051
OD_K2POWER_DIOPTERS: 47.75
OD_K1POWER_DIOPTERS: 46.50
OS_K1POWER_DIOPTERS: 46.75
OS_AXISANGLE_DEGREES: 123
OS_K2POWER_DIOPTERS: 47.50

## 2024-11-06 ASSESSMENT — CONFRONTATIONAL VISUAL FIELD TEST (CVF)
OD_FINDINGS: FULL
OS_FINDINGS: FULL

## 2024-11-06 ASSESSMENT — VISUAL ACUITY
OS_BCVA: 20/40
OD_BCVA: 20/20

## 2024-11-06 ASSESSMENT — TONOMETRY
OS_IOP_MMHG: 17
OD_IOP_MMHG: 17

## 2024-11-06 ASSESSMENT — VASCULARIZATION: OD_VASCULARIZATION: PANNUS

## 2025-03-05 ENCOUNTER — DOCTOR'S OFFICE (OUTPATIENT)
Facility: LOCATION | Age: 40
Setting detail: OPHTHALMOLOGY
End: 2025-03-05
Payer: COMMERCIAL

## 2025-03-05 DIAGNOSIS — H16.421: ICD-10-CM

## 2025-03-05 DIAGNOSIS — H40.033: ICD-10-CM

## 2025-03-05 PROBLEM — H52.7 REFRACTIVE ERROR: Status: ACTIVE | Noted: 2025-03-05

## 2025-03-05 PROCEDURE — 92012 INTRM OPH EXAM EST PATIENT: CPT | Performed by: OPHTHALMOLOGY

## 2025-03-05 ASSESSMENT — REFRACTION_MANIFEST
OD_ADD: +1.50
OS_CYLINDER: -0.25
OS_SPHERE: +3.25
OD_SPHERE: +4.75
OS_VA1: 20/20
OD_CYLINDER: -0.75
OS_ADD: +1.50
OD_VA1: 20/30-
OS_AXIS: 055
OD_AXIS: 115

## 2025-03-05 ASSESSMENT — REFRACTION_AUTOREFRACTION
OS_CYLINDER: -1.00
OD_CYLINDER: -1.50
OS_AXIS: 065
OS_SPHERE: +4.50
OD_SPHERE: +5.25
OD_AXIS: 126

## 2025-03-05 ASSESSMENT — REFRACTION_CURRENTRX
OD_SPHERE: +3.50
OD_CYLINDER: -0.75
OS_CYLINDER: +0.25
OD_ADD: +0.75
OS_SPHERE: +3.25
OS_CYLINDER: -0.25
OD_ADD: +1.50
OS_VPRISM_DIRECTION: PROGS
OD_CYLINDER: +1.25
OS_AXIS: 055
OS_ADD: +1.50
OD_OVR_VA: 20/
OS_SPHERE: +3.00
OD_SPHERE: +4.50
OS_OVR_VA: 20/
OD_AXIS: 041
OS_OVR_VA: 20/
OD_AXIS: 111
OS_ADD: +1.25
OD_VPRISM_DIRECTION: PROGS
OS_AXIS: 139
OD_OVR_VA: 20/

## 2025-03-05 ASSESSMENT — KERATOMETRY
OS_K1POWER_DIOPTERS: 46.75
OS_AXISANGLE_DEGREES: 129
OD_AXISANGLE_DEGREES: 055
OD_K2POWER_DIOPTERS: 47.75
OD_K1POWER_DIOPTERS: 46.25
OS_K2POWER_DIOPTERS: 47.50

## 2025-03-05 ASSESSMENT — VASCULARIZATION: OD_VASCULARIZATION: PANNUS

## 2025-03-05 ASSESSMENT — CONFRONTATIONAL VISUAL FIELD TEST (CVF)
OS_FINDINGS: FULL
OD_FINDINGS: FULL

## 2025-03-05 ASSESSMENT — VISUAL ACUITY
OS_BCVA: 20/30-
OD_BCVA: 20/20

## 2025-03-05 ASSESSMENT — TONOMETRY
OD_IOP_MMHG: 17
OS_IOP_MMHG: 17

## 2025-03-25 NOTE — PHYSICAL THERAPY INITIAL EVALUATION ADULT - RANGE OF MOTION EXAMINATION, REHAB EVAL
R knee flexion 75 degrees, extension - 10 degrees, L knee flexion 80 degrees, extension - 10 degrees/deficits as listed below no... ICU Vital Signs Last 24 Hrs  T(C): 36.4 (09 Jun 2021 07:41), Max: 36.4 (09 Jun 2021 07:41)  T(F): 97.6 (09 Jun 2021 07:41), Max: 97.6 (09 Jun 2021 07:41)  HR: 77 (09 Jun 2021 07:41) (77 - 77)  BP: 110/79 (09 Jun 2021 07:41) (110/79 - 110/79)  BP(mean): 84 (09 Jun 2021 07:41) (84 - 84)  ABP: --  ABP(mean): --  RR: 16 (09 Jun 2021 07:41) (16 - 16)  SpO2: 98% (09 Jun 2021 07:41) (98% - 98%)      PHYSICAL EXAM:    Constitutional: NAD, awake and alert, well-developed  HEENT: PERR, EOMI, Normal Hearing, MMM  Neck: Soft and supple, No LAD, No JVD  Respiratory: Breath sounds are clear bilaterally, No wheezing, rales or rhonchi  Cardiovascular: S1 and S2, regular rate and rhythm, no Murmurs, gallops or rubs  Gastrointestinal: Bowel Sounds present, soft, nontender, nondistended, no guarding, no rebound  Extremities: No peripheral edema  Vascular: 2+ peripheral pulses  Neurological: A/O x 3, no focal deficits  Musculoskeletal: 5/5 strength b/l upper and lower extremities  Skin: No rashes ICU Vital Signs Last 24 Hrs  T(C): 36.4 (09 Jun 2021 07:41), Max: 36.4 (09 Jun 2021 07:41)  T(F): 97.6 (09 Jun 2021 07:41), Max: 97.6 (09 Jun 2021 07:41)  HR: 77 (09 Jun 2021 07:41) (77 - 77)  BP: 110/79 (09 Jun 2021 07:41) (110/79 - 110/79)  BP(mean): 84 (09 Jun 2021 07:41) (84 - 84)  ABP: --  ABP(mean): --  RR: 16 (09 Jun 2021 07:41) (16 - 16)  SpO2: 98% (09 Jun 2021 07:41) (98% - 98%)      PHYSICAL EXAM:    Constitutional: NAD, awake and alert, well-developed  HEENT: PERR, EOMI, Normal Hearing, MMM; blind in left eye  Neck: Soft and supple, No LAD, No JVD  Respiratory: Breath sounds are clear bilaterally, No wheezing, rales or rhonchi  Cardiovascular: S1 and S2, regular rate and rhythm, no Murmurs, gallops or rubs  Gastrointestinal: Bowel Sounds present, soft, nontender, nondistended, no guarding, no rebound  Extremities: No peripheral edema  Vascular: 2+ peripheral pulses  Neurological: A/O x 3, no focal deficits; nl cerebellar exam, no motor or sensory disturbances  Musculoskeletal: 5/5 strength b/l upper and lower extremities  Skin: No rashes

## (undated) DEVICE — PACK TOTAL JOINT

## (undated) DEVICE — NDL HYPO SAFE 22G X 1.5" (BLACK)

## (undated) DEVICE — DRAIN JACKSON PRATT 3 SPRING RESERVOIR W 10FR PVC DRAIN

## (undated) DEVICE — SYR LUER LOK 50CC

## (undated) DEVICE — PACK BASIC

## (undated) DEVICE — PREP CHLORAPREP HI-LITE ORANGE 26ML

## (undated) DEVICE — DRAPE INSTRUMENT POUCH 6.75" X 11"

## (undated) DEVICE — SAW BLADE STRYKER SAGITTAL DUAL CUT 75X18X1.27MM

## (undated) DEVICE — ZIMMER PULSAVAC PLUS FAN KIT

## (undated) DEVICE — FRA-ESU BOVIE FORCE TRIAD T0E42286E: Type: DURABLE MEDICAL EQUIPMENT

## (undated) DEVICE — GLV 9 PROTEXIS (WHITE)

## (undated) DEVICE — DRSG WEBRIL 6"

## (undated) DEVICE — DRAPE STERI-DRAPE INCISE 32X33"

## (undated) DEVICE — DRAPE TOWEL BLUE 17" X 24"

## (undated) DEVICE — SYR LUER LOK 10CC

## (undated) DEVICE — DRAPE SHOWER CURTAIN ISOLATION

## (undated) DEVICE — DRAPE LIMB BILATERAL

## (undated) DEVICE — TOURNIQUET ESMARK 6"

## (undated) DEVICE — SUT VICRYL 2-0 27" CT-2 UNDYED

## (undated) DEVICE — SUCTION YANKAUER NO CONTROL VENT

## (undated) DEVICE — SAW SAGITTAL 2108 SERIES 20.5X1.27X85MM

## (undated) DEVICE — DRAPE STERI-DRAPE INCISE 19X17"

## (undated) DEVICE — DRSG AQUACEL 3.5 X 12"

## (undated) DEVICE — ZIMMER BLADE PATELLA REAMER 35MM

## (undated) DEVICE — ZIMMER BLADE PATELLA REAMER W PILOT HOLE SZ 32

## (undated) DEVICE — NDL HYPO SAFE 18G X 1.5" (PINK)

## (undated) DEVICE — MEDICATION LABELS W MARKER

## (undated) DEVICE — MARKING PEN W RULER

## (undated) DEVICE — DRSG ACE BANDAGE 6"

## (undated) DEVICE — BLADE SURGICAL #15 CARBON

## (undated) DEVICE — DRSG AQUACEL 3.5 X 14"

## (undated) DEVICE — SUT VICRYL 0 18" CT-1 UNDYED (POP-OFF)

## (undated) DEVICE — GOWN XXL EXTRA LONG

## (undated) DEVICE — GLV 9 PROTEXIS ORTHO (BROWN)

## (undated) DEVICE — DRAPE EXTREMITY 87" X 128.5"

## (undated) DEVICE — HOOD FLYTE STRYKER HELMET SHIELD

## (undated) DEVICE — CRYO/CUFF GRAVITY COOLER KNEE LARGE

## (undated) DEVICE — DRSG AQUACEL 3.5 X 10"

## (undated) DEVICE — BLADE SURGICAL #20 CARBON

## (undated) DEVICE — ZIMMER MIXING BOWL WITH SPATULA

## (undated) DEVICE — DRAPE 3/4 SHEET W REINFORCEMENT 56X77"